# Patient Record
Sex: FEMALE | Race: WHITE | NOT HISPANIC OR LATINO | Employment: OTHER | URBAN - METROPOLITAN AREA
[De-identification: names, ages, dates, MRNs, and addresses within clinical notes are randomized per-mention and may not be internally consistent; named-entity substitution may affect disease eponyms.]

---

## 2018-01-16 ENCOUNTER — OFFICE VISIT (OUTPATIENT)
Dept: PSYCHIATRY | Facility: CLINIC | Age: 28
End: 2018-01-16
Attending: PSYCHOLOGIST
Payer: COMMERCIAL

## 2018-01-16 DIAGNOSIS — F40.10 SOCIAL ANXIETY DISORDER: ICD-10-CM

## 2018-01-16 DIAGNOSIS — F42.2 MIXED OBSESSIONAL THOUGHTS AND ACTS: Primary | ICD-10-CM

## 2018-01-16 NOTE — MR AVS SNAPSHOT
After Visit Summary   1/16/2018    Corie Molina    MRN: [id number]           Patient Information     Date Of Birth          1990        Visit Information        Provider Department      1/16/2018 9:00 AM [facility]        Today's Diagnoses     Mixed obsessional thoughts and acts    -  1    Social anxiety disorder        Depression, postpartum, postpartum condition           Follow-ups after your visit        Your next 10 appointments already scheduled     Feb 12, 2018 12:00 PM CST   Adult Psychotherapy with Honey Shearer   Psychiatry Clinic ([facility])    [address]  [address] [facility] [id number] [phone]            Feb 20, 2018 10:00 AM CST   Women's Well-Being with Alexus Goncalves MD   Psychiatry Clinic ([facility])    [address] [facility] [id number] [phone]              Who to contact     Please call your clinic at [phone] to:    Ask questions about your health    Make or cancel appointments    Discuss your medicines    Learn about your test results    Speak to your doctor   If you have compliments or concerns about an experience at your clinic, or if you wish to file a complaint, please contact [facility] Patient Relations at [phone] or email us at [email or web address]         Additional Information About Your Visit        MyChart Information     [employer] is an electronic gateway that provides easy, online access to your medical records. With CoverMe, you can request a clinic appointment, read your test results, renew a prescription or communicate with your care team.     To sign up for [employer] visit the website at [email or web address]   You will be asked to enter the access code listed below, as well as some personal information. Please follow the directions to create  your username and password.     Your access code is: [email or web address]  Expires: 2018 10:17 AM     Your access code will  in 90 days. If you need help or a new code, please contact your [facility] or call [phone] for assistance.        Care EveryWhere ID     This is your Care EveryWhere ID. This could be used by other organizations to access your [facility] medical records  [id number]         Blood Pressure from Last 3 Encounters:   18 113/79   18 119/73    Weight from Last 3 Encounters:   18 59.1 kg (130 lb 6.4 oz)   18 60 kg (132 lb 3.2 oz)              We Performed the Following     PSYCHOLOGICAL TEST BY PSYCHOLOGIST/MD, PER HR        Primary Care Provider Office Phone # Fax #    Stepan CLARICE Perez MD, [phone]       [address] [address]        Equal Access to Services     GIULIA DOTY : Hadii aad ku hadasho Soomaali, waaxda luqadaha, qaybta kaalmada adeegyada, waxay idiin hayaan adeeg kharash la'karenn . So [facility] [phone].    ATENCIÓN: Si habla español, tiene a marin disposición servicios gratuitos de asistencia lingüística. Llame al [phone].    We comply with applicable federal civil rights laws and [place] laws. We do not discriminate on the basis of race, color, national origin, age, disability, sex, sexual orientation, or gender identity.            Thank you!     Thank you for choosing PSYCHIATRY CLINIC  for your care. Our goal is always to provide you with excellent care. Hearing back from our patients is one way we can continue to improve our services. Please take a few minutes to complete the written survey that you may receive in the mail after your visit with us. Thank you!             Your Updated Medication List - Protect others around you: Learn how to safely use, store and throw away your medicines at www.disposemymeds.org.      Notice  As of 2018 11:59 PM     You have not been prescribed any medications.

## 2018-01-22 ENCOUNTER — OFFICE VISIT (OUTPATIENT)
Dept: PSYCHIATRY | Facility: CLINIC | Age: 28
End: 2018-01-22
Payer: COMMERCIAL

## 2018-01-22 DIAGNOSIS — F42.2 MIXED OBSESSIONAL THOUGHTS AND ACTS: Primary | ICD-10-CM

## 2018-01-22 DIAGNOSIS — F40.10 SOCIAL ANXIETY DISORDER: ICD-10-CM

## 2018-01-22 NOTE — PROGRESS NOTES
[facility]                                                                                                        Department of Psychiatry  [phone] (Office)                                                                                                      [address] [place]  [phone] (Clinic)                                                                                                       [address] [place] [address]  [phone] (Fax)                                                                                                          [place]  [address]          [facility]OM[facility] WELL[facility]ING CLINIC  DIAGNOSTIC EVALUATION AND PSYCHOLOGICAL ASSESSMENT        Patient: Corie Molina                MRN: [id number]  YOB: 1990                                           Evaluator: Dyan Reyes Psy.D., L.PHipolito  Date of Visit: 2018     Diagnostic interview time with patient: 1.75 hours  Psychological assessment scoring, interpretation, and report writin hours    IDENTIFYING DATA:  Corie is a 27-year-old female who presented for the current evaluation as part of the intake process for the Women s Wellbeing Program. She is currently 6 weeks post-partum with her second child and is breast feeding. She was referred by [facility] following participation in their Partial Hospitalization Program (5 hours per day/4 days per week). She participated in their PHP starting at 38 weeks pregnant, she left the program to have her baby, and returned 8 days post partum and completed the program on 18. She reported that she was diagnosed with OCD and she has difficulty with perfectionism.    The following information was obtained through an interview and questionnaires completed by Corie. Limits of confidentiality and the purpose of the appointment (diagnostic evaluation) were described at the beginning of the session.     CHIEF COMPLAINT:   OCD, perfectionism, and sadness.  "\"It has been consuming my life,  and  I m feeling bad about not being the person I want to be.      MENTAL HEALTH HISTORY AND DIAGNOSTIC INTERVIEW:  Corie completed the MINI International Neuropsychiatric Interview to aid in diagnostic assessment of current psychological functioning.     Corie reported a history of post-partum depression 2 months after the birth of her 2-year-old son. She reported current symptoms of depression that have worsened in the post partum period. In the past two weeks she has felt sad, hopeless, decreased appetite, slowed down, low energy, worthlessness and guilt, and hopelessness some to much of the time, typically precipitated by her symptoms of obsessive compulsive disorder (described below). She also reported feeling loss of interest or pleasure in activities she used to enjoy, all of the time. She denied difficulty falling or staying asleep and noted this is likely due to her medication. She reported she does not hear her baby cry and her  wakes her about 2 times per night to breastfeed her baby. She denies current and a history of thoughts of death and suicide and she denies past suicide attempts.     Corie reported recurrent anxious thoughts that are unwanted and distressing about multiple things (e.g., messes in her home, messes her son makes, orderliness, cleanliness, germs, food) that she tries to reduce by cleaning, organizing, picking up, planning, etc. to make things  just so.  In the past, following the birth of her son, she had a recurring intrusive image of a stabbing (not necessarily of her son or herself), though she denies current intrusive imagery. Currently, she feels driven to repeatedly clean, organize, order things, plan or prepare so that things are  just so  and follow her own rigid rule about how things should be. These rituals are done to prevent or reduce anxiety and distress and are excessive and unreasonable. She described herself as having "  unrelenting standards  for herself and everyone else. She reported that she likes to plan what she is going to eat based on the food groups, and she often writes out detailed menus and ingredients and will not diverge from them. For example, if she has rice for lunch she will not have it for dinner, she is unable to eat leftovers, and she cannot eat foods or sides that  don t go together . She also reported that if her  gives her son foods that  don t go together  (e.g., pizza and pasta), she can t watch him eat it. She struggles shopping in grocery stores due to her rules about food/meals and making the right decisions. She refuses to use public restrooms and avoids them at all costs due to concerns about cleanliness. She reported she will also regularly change her own and her children s clothing after being in public due to germs, and between most activities. She will sometimes vacuum her house twice before people come to her home. She reported significant anxiety about messes her son makes, and reported she can t enjoy playing with her son because things are out of place and she will compulsively pick things up and put things back in their place, as she feels driven to keep things in order. She also asks her  to sit a certain way on the couch so he does not mess up the placement of the couch cushions. These obsessions and compulsions take up much of her day and it can take long periods to get out of the house, and is exhausting for Corie. She reported spending greater than 3 and up to 8 hours per day engaging in compulsions, and that her mind is consumed with obsessive thoughts greater than 3 and up to 8 hours per day. She reported feelings of significant irritability, anger and frustration associated with her obsessions and compulsions and finds them very disturbing. These concerns are leading to stress in her marital relationship, as well as in her relationships with her children. She reported  her OCD concerns have come out a lot within her relationship with her son, who would interrupt her ability to complete her compulsions. Learning this has allowed her to understand why she had been getting so frustrated and upset with him, and she has worked on parenting him differently in light of what she has learned about her OCD. In addition, she reported that she left her family twice while she was pregnant because she did not know what to do, and when she started the PHP program it was very helpful to gain understanding about her symptoms.       Corie tried engaging in some exposure therapy while participating in the Mother Baby Program, which included not picking up her house one night, and having her son wear non-matching pajamas. She reported these both did not go well.     Corie reported persistent fear and significant anxiety about being watched or judged in social situations. She reported anxiety initiating or maintaining a conversation, participating in small groups, speaking to authority figures, attending parties, public speaking, and performing in front of others. These social situations almost always bring on fear or anxiety, and she avoids them as much as possible. She noted significant social anxiety also associated with her son and parenting her son. She feels a sense of doom in some social situations and often feels the need to plan ahead for social situations as much as possible, and will often ask her  for feedback on how she did in social situations they are in together (e.g., if they have people over to their home). She reported her anxiety is excessive and unreasonable and causes significant distress and interference with her functioning, and has been persistent for at least 6 months.    Corie also reported that she has an anxious habit of skin picking. She reported picking skin on her face (e.g., dry skin, blemishes), back, and the back of her arm. She reported a decrease in  frequency currently, though it has resulted bleeding in the past.    Corie reported a history of an intense fear of gaining weight and a history of engaging in eating disordered behaviors in high school and college (about 4 years total) including: restricting, over-exercising (2-3 times per day), experimenting with purging and laxative use. She reported that when she was exercising this much it was the  happiest time in her life  and she is  striving to be that person again . While she denied engaging in current eating disordered behaviors per the interview, she endorsed ongoing fear of gaining weight, a hyper focus on food and eating, and food related obsessions and compulsions in the form of wanting her eating, meal planning and meals to be  just so.  (see additional related information associated with symptoms of OCD).     Corie denied a history of experiencing abuse or traumatic experiences. She denied symptoms consistent with Bipolar Disorder, Panic Disorder, Substance Abuse Disorders, Psychotic Disorders, Post Traumatic Stress Disorder, or Attention Deficit Hyperactivity Disorder.      PREVIOUS PSYCHIATRIC HISTORY: Corie reported that she experienced symptoms of anxiety and an eating disorder in high school and college, though she did not receive treatment. About 3 years ago while planning her wedding she reported feeling very anxious and angry and she started a medication (did not remember the name) prescribed by her primary care doctor. She did not find the medication helpful, and discontinued it one month after the wedding. She experienced post-partum depression 2 months after the birth of her son, and she subsequently participated in therapy for about 7 months. She reported having a positive experience participating in the PHP program at the [facility] (as noted above), where it was first identified that she had symptoms consistent with OCD.      CURRENT PSYCHIATRIC MEDICATIONS: Corie is  taking sertraline 150 mg and risperidone 0.5mg (both taken at night). She started taking sertraline at 38 weeks pregnant, and she began taking risperidone about 3 weeks ago, both prescribed by Aimee Mariee MD at [facility].     PREGNANCY/MEDICAL HISTORY: Corie reported a notable difference between her two pregnancy and birth experiences. She said she gained 50 lbs. with her son, and 20 lbs. with her daughter. She reported a negative experience with medical professionals and during the birth process with her son. She gave birth to him at [facility] and due to distress he was taken to the NICU for several hours without Corie being able to hold him. She had to ask if she could see him and ask what his gender was. She reported this impacted her ability to connect with him, which was later addressed in therapy at the [facility] when she was seeking treatment around the birth of her daughter. She also reported a very difficult recovery from her son s birth and that she experienced exceptional pain and difficulty walking. She reported a much more positive experience during the pregnancy and birth process with her daughter. She worked with a midwife and gave birth at [facility] noting the birth was a  wonderful experience.     No additional medical concerns or history of chronic illness was reported.      SOCIAL HISTORY: Corie has been  to her  Nando for 3 years and they live with their 2-year-old son (Lamin) and 6-week old daughter (Valerie) in [place], [place]. She attended college and received a Bachelor s Degree. 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    She has one younger sister and an older brother and her parents are  and live in the [place] [facility]. Growing up she described her dad as  really strict  and said that she was the  perfect child.  Currently, she said  she sees her mother regularly and finds her helpful in times of crisis and not as helpful at other periods of time. She noted feeling like she wants to please her mom and this impacts her drive for perfectionism.       FAMILY PSYCHIATRIC HISTORY: Corie s mother has a history of anxiety and untreated alcoholism. She thinks her father takes medication for a mental health concern, though she does not know what he is being treated for. Her sister has a history of depression and she participated in a hospital day treatment program for depression. No additional family history of psychiatric disorders was reported.      PSYCHOLOGICAL TESTS ADMINISTERED:    M.I.N.I. International Neuropsychiatric Interview  Self-Reported Symptom Ratings  Sleep Disturbance  Yordan Exercise  Drug Abuse Screen Test (DAST 10)  Behavioral Inhibition Scale (BIS)  Behavioral Activation Scale (BAS)  Adverse Childhood Events (ACE)  Personal Wellbeing  Rhodes  Depression Scale (EPDS)  Generalized Anxiety Disorder 7 Scale (NILE-7)  Za Brown Obsessive-Compulsive Scale (YBOCS)  Eating Disorder Examination Questionnaire (ERIK-Q)       Measures                                                            Raw Scores                              Z Scores  Sleep Disturbance                                                      22.00                                         .22  Yordan Exercise                                                            6.00                                          NA  AUDITC                                                                    1.00                                          NA DAST10                                                                     0.00                                          NA                      Personal Wellbeing                                                      61.43                                      - 0.88                    ACE                                                                             1.00                                          NA                                            BAS Drive                                                                 3.00                                    0.32                                        BAS Reward                                                                2.20                                        -3.02   BAS Fun                                                                    1.00                                           -3.06  BIS                                                                             4.00                                           2.04  EPDS                                                                          17.00                                          NA  NILE-7                                                                        16.00                                          NA  YBOCS                                                                        29.00                                         NA  ERIK-Q Restraint                                                            3.60                                          1.69  ERIK-Q Eating Concern                                                  2.80                                           1.88  ERIK-Q Weight Concern                                                 5.40                                           2.32  ERIK-Q Shape Concern                                                   6.00                                           2.2  ERIK-Q Global                                                                 4.45                                           2.33      INTERPRETATION OF PSYCHOLOGICAL TESTS:   Corie reported that her most concerning symptoms are disrupted functioning, anxiety, ruminative thoughts, and obsessions. She reports no trouble falling asleep and some trouble  staying asleep. She reports engaging in limited physical activity at this time. She reports eating 3 meals a day more than half the time, eating healthy food more than half the time, not engaging in overeating, and always under-eating or restricting her eating. She does not report concerning substance use. She endorsed personal wellbeing in the significantly below average range and high perceived stressed. She endorsed experiencing two concerning adverse childhood events. She reported motivation for achieving goals within normal limits, and motivation in response to rewards and motivation in response to fun activities in the significantly below average range. She reported elevated sensitivity to punishments. Her primary coping skills include acceptance, planning, active coping and positive reframing, which are considered positive ways of coping, as well as self blame, which is considered a less useful way of coping. She endorsed severe generalized anxiety symptoms, clinical threshold depression symptoms, and severe obsessive compulsive symptoms. In terms of disordered eating, she endorsed above average restraint, above average eating concern, significantly above average shape concern, significantly above average weight concern, and significantly above average global eating disorder behavior. She denied binge eating, self-induced vomiting, laxative misuse, and driven exercise.     MENTAL STATUS EXAM:  Corie was casually dressed, nicely groomed and appeared her stated age. Eye contact was appropriate. Mood appeared anxious. Corie was pleasant, engaged, responsive and open. She was oriented to person, place, and time. She was cooperative and answered the questions asked by the examiner. Attention and concentration were in the normal range. Her speech was normal in tone, rate and volume. Some rigidity noted in her posture and physical presentation. She readily responded to her daughter s cues and breast fed her during  the evaluation and changed her daughter s diaper as well. Her thought process was linear, logical, and goal-oriented. Insight and judgment were intact. She demonstrated growing insight into her symptoms and seemed motivated for treatment.       DSM-5 DIAGNOSES:  Obsessive Compulsive Disorder (OCD)  Social Anxiety Disorder (Social Phobia)  Depressive Disorder NEC  R/O Eating Disorder NEC    PLAN AND RECOMMENDATIONS:   Taken together, the results of this evaluation suggest that Corie is likely to benefit from interventions to target her symptoms consistent with a primary diagnosis of OCD, associated mood disorder, and her symptoms of social anxiety disorder. Given Corie s interest in being followed in this clinic for medication management, Corie will complete an evaluation with Alexus Goncalves MD, on 1/23/2018 to discuss ongoing pharmacologic treatments. She is also scheduled on 1/22/2018 to begin therapy with a provider in this clinic specializing in the treatment of OCD to initiate Cognitive Behavioral Therapy (CBT).          Dyan Reyse Psy.D., L.P.    Department of Psychiatry

## 2018-01-22 NOTE — MR AVS SNAPSHOT
After Visit Summary   1/22/2018    Corie Molina    MRN: [id number]           Patient Information     Date Of Birth          1990        Visit Information        Provider Department      1/22/2018 11:30 AM [facility]        Today's Diagnoses     Mixed obsessional thoughts and acts    -  1    Social anxiety disorder        Depression, postpartum, postpartum condition           Follow-ups after your visit        Your next 10 appointments already scheduled     Feb 05, 2018 12:00 PM CST   Adult Psychotherapy with [facility]   Psychiatry Clinic ([facility])    [address]  [address] [facility] [address]   [phone]            Feb 06, 2018 10:30 AM CST   Women's Well-Being with Alexus Goncalves MD   Psychiatry Clinic ([facility])    [address] [place] [address]  [address] [facility] [address]   [phone]            Feb 20, 2018 10:00 AM CST   Women's Well-Being with Alexus Goncalves MD   Psychiatry Clinic ([address]  [address] [facility] [address] [phone]              Who to contact     Please call your clinic at [phone] to:    Ask questions about your health    Make or cancel appointments    Discuss your medicines    Learn about your test results    Speak to your doctor   If you have compliments or concerns about an experience at your clinic, or if you wish to file a complaint, please contact [facility] Patient Relations at [phone] or email us at [email or web address]         Additional Information About Your Visit        MyChart Information     [employer] is an electronic gateway that provides easy, online access to your medical records. With [employer], you can request a clinic appointment, read your test results, renew a prescription or communicate with your care  team.     To sign up for Tip or Skipt visit the website at [email or web address]   You will be asked to enter the access code listed below, as well as some personal information. Please follow the directions to create your username and password.     Your access code is: [email or web address]  Expires: 2018 10:17 AM     Your access code will  in 90 days. If you need help or a new code, please contact your [facility] or call [phone] for assistance.        Care EveryWhere ID     This is your Care EveryWhere ID. This could be used by other organizations to access your [place] medical records  [id number]         Blood Pressure from Last 3 Encounters:   18 119/73    Weight from Last 3 Encounters:   18 60 kg (132 lb 3.2 oz)              Today, you had the following     No orders found for display       Primary Care Provider Office Phone # Fax #    Stepan CLARICE Perez MD, [phone]       [address] [address]        Equal Access to Services     [facility]: Hadii aad ku hadasho Soomaali, waaxda luqadaha, qaybta kaalmada adeginayada, rupinder gunderson . So [facility] [phone].    ATENCIÓN: Si habla español, tiene a marin disposición servicios gratuitos de asistencia lingüística. Armida al [phone].    We comply with applicable federal civil rights laws and [place] laws. We do not discriminate on the basis of race, color, national origin, age, disability, sex, sexual orientation, or gender identity.            Thank you!     Thank you for choosing [facility] CLINIC  for your care. Our goal is always to provide you with excellent care. Hearing back from our patients is one way we can continue to improve our services. Please take a few minutes to complete the written survey that you may receive in the mail after your visit with us. Thank you!             Your Updated Medication List - Protect  others around you: Learn how to safely use, store and throw away your medicines at www.disposemymeds.org.      Notice  As of 1/22/2018 11:59 PM    You have not been prescribed any medications.

## 2018-01-23 ENCOUNTER — OFFICE VISIT (OUTPATIENT)
Dept: PSYCHIATRY | Facility: CLINIC | Age: 28
End: 2018-01-23
Attending: PSYCHIATRY & NEUROLOGY
Payer: COMMERCIAL

## 2018-01-23 VITALS — WEIGHT: 132.2 LBS | SYSTOLIC BLOOD PRESSURE: 119 MMHG | HEART RATE: 71 BPM | DIASTOLIC BLOOD PRESSURE: 73 MMHG

## 2018-01-23 DIAGNOSIS — F42.9 OBSESSIVE-COMPULSIVE DISORDER, UNSPECIFIED TYPE: Primary | ICD-10-CM

## 2018-01-23 PROBLEM — O99.340 DEPRESSION COMPLICATING PREGNANCY, ANTEPARTUM: Status: ACTIVE | Noted: 2017-11-16

## 2018-01-23 PROBLEM — F32.A DEPRESSION COMPLICATING PREGNANCY, ANTEPARTUM: Status: ACTIVE | Noted: 2017-11-16

## 2018-01-23 PROCEDURE — G0463 HOSPITAL OUTPT CLINIC VISIT: HCPCS | Mod: ZF

## 2018-01-23 RX ORDER — ACETAMINOPHEN AND CODEINE PHOSPHATE 120; 12 MG/5ML; MG/5ML
1 SOLUTION ORAL DAILY
COMMUNITY
End: 2021-10-25

## 2018-01-23 RX ORDER — RISPERIDONE 0.5 MG/1
0.5 TABLET ORAL 2 TIMES DAILY
Qty: 60 TABLET | Refills: 1 | Status: SHIPPED | OUTPATIENT
Start: 2018-01-23 | End: 2018-02-06

## 2018-01-23 RX ORDER — SERTRALINE HYDROCHLORIDE 100 MG/1
150 TABLET, FILM COATED ORAL DAILY
Qty: 30 TABLET | Refills: 1 | Status: SHIPPED | OUTPATIENT
Start: 2018-01-23 | End: 2018-02-06

## 2018-01-23 ASSESSMENT — PAIN SCALES - GENERAL: PAINLEVEL: NO PAIN (0)

## 2018-01-23 NOTE — MR AVS SNAPSHOT
After Visit Summary   1/23/2018    Corie Molina    MRN: [id number]           Patient Information     Date Of Birth          1990        Visit Information        Provider Department      1/23/2018 9:00 AM [facility]calves [facility]        Today's Diagnoses     Obsessive-compulsive disorder, unspecified type    -  1      Care Instructions    1) MEDICATIONS:  - Continue Zoloft 150 mg daily   - Continue Risperdal 0.5 mg twice daily      2) THERAPY: Continue couples and individual     3) RTC: in 4-6 weeks with Dr. Goncalves for 30 min [facility] in [facility]. Please call Danette Ramírez RN @ [phone] with any questions or concerns. Danette will call you in ~1 week to check-in and consider an increase in Zoloft at that time.    4) OTHER:  -Patient was encouraged to bring her partner/support person to future appts  -[employer]ed is a good source for information on medications in breastfeeding  -www.womensmentalhealth.org is a good resource for information about psychiatric medication in pregnancy/lactation    5) CRISIS NUMBERS:   Provided routinely in [facility].  Pregnancy & Post[place] Support [place] (PPS) Helpline: [phone] (Call or Text)            Follow-ups after your visit        Follow-up notes from your care team     Return in about 4 weeks (around 2/20/2018).      Your next 10 appointments already scheduled     Jan 29, 2018 12:00 PM CST   Adult Psychotherapy with [facility]   Psychiatry Clinic ([facility])    [address] [place] [address] [facility] [id number] [phone]            Feb 20, 2018 10:00 AM CST   Women's Well-Being with Alexus Goncalves MD   Psychiatry Clinic ([facility])    [address] [place] [address] [facility] [address] [phone]              Who to contact     Please call your clinic at [phone] to:    Ask questions about your health    Make or cancel appointments    Discuss  your medicines    Learn about your test results    Speak to your doctor   If you have compliments or concerns about an experience at your clinic, or if you wish to file a complaint, please contact [facility] Patient Relations at [phone] or email us at [email or web address]         Additional Information About Your Visit        [employer] Information     [employer] is an electronic gateway that provides easy, online access to your medical records. With [employer], you can request a clinic appointment, read your test results, renew a prescription or communicate with your care team.     To sign up for [employer] visit the website at [email or web address]   You will be asked to enter the access code listed below, as well as some personal information. Please follow the directions to create your username and password.     Your access code is: [email or web address]  Expires: 2018 10:17 AM     Your access code will  in 90 days. If you need help or a new code, please contact your [facility] or call [phone] for assistance.        Care EveryWhere ID     This is your Care EveryWhere ID. This could be used by other organizations to access your [place] medical records  [id number]        Your Vitals Were     Pulse                   71            Blood Pressure from Last 3 Encounters:   18 119/73    Weight from Last 3 Encounters:   18 60 kg (132 lb 3.2 oz)              Today, you had the following     No orders found for display       Primary Care Provider Office Phone # Fax #    Stepan CLARICE Perez MD, [phone]       [address] [address]        Equal Access to Services     ZENAIDA DOTY : Hadii leda Kaye, darlene escamilla, rupinder patricia. So [facility] [phone].    ATENCIÓN: Si habla español, tiene a marin disposición  servicios gratuitos de asistencia lingüística. Armida to [phone].    We comply with applicable federal civil rights laws and [place] laws. We do not discriminate on the basis of race, color, national origin, age, disability, sex, sexual orientation, or gender identity.            Thank you!     Thank you for choosing [facility]IN[facility]  for your care. Our goal is always to provide you with excellent care. Hearing back from our patients is one way we can continue to improve our services. Please take a few minutes to complete the written survey that you may receive in the mail after your visit with us. Thank you!             Your Updated Medication List - Protect others around you: Learn how to safely use, store and throw away your medicines at www.disposemymeds.org.          This list is accurate as of: 1/23/18 10:17 AM.  Always use your most recent med list.                   Brand Name Dispense Instructions for use Diagnosis    ADDERALL PO      Take 20 mg by mouth daily        RISPERIDONE PO      Take 0.5 mg by mouth 2 times daily        ZOLOFT PO      Take 150 mg by mouth daily

## 2018-01-23 NOTE — NURSING NOTE
Chief Complaint   Patient presents with     Eval/Assessment     OCD, anxiety        Reviewed Allergies, Medications, Pharmacy, Smoking Status, and Pain Level  Administered Abuse Screening Questions   Obtained Weight, Blood Pressure, Heart Rate

## 2018-01-23 NOTE — PATIENT INSTRUCTIONS
1) MEDICATIONS:  - Continue Zoloft 150 mg daily   - Continue Risperdal 0.5 mg twice daily      2) THERAPY: Continue couples and individual     3) RTC: in 4-6 weeks with Dr. Goncalves for 30 min MFU in [facility]. Please call Danette Ramírez RN @ [phone] with any questions or concerns. Danette will call you in ~1 week to check-in and consider an increase in Zoloft at that time.    4) OTHER:  -Patient was encouraged to bring her partner/support person to future appts  -[employer] is a good source for information on medications in breastfeeding  -www.womensmentalhealth.org is a good resource for information about psychiatric medication in pregnancy/lactation    5) CRISIS NUMBERS:   Provided routinely in [place].  Pregnancy & Post[place] Support [place] (PPSM) Helpline: [phone] (Call or Text)

## 2018-01-23 NOTE — PROGRESS NOTES
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    The patient was referred by Dr. Aimee Mariee of [facility] for evaluation of depression and OCD.    Partner/ Support: Nando ()  Children: Lamin (born 2016) and Valerie (born 2017)  Therapist: Honey Shearer/Dr. Braga for CBT (sees weekly) and Sridhar Duarte of [facility] for couples therapy Q every other week (Auth to discuss on file)   PCP: Stepan Perez MD  OB-GYN: DILEEP Gregory CNM of [facility]    She is accompanied by her two children Lamin and Valerie today. She had planned to leave them home, but the family member who was going to [facility] became ill.   Chief Concern                                                                                                      \" I want to make sure I'm on the right medications. \"       History of Present Illness                                                                                      4,4     Pertinent Background:  OCD predating pregnancies (although only recently dx), PPD after the birth of her first child, disordered eating and intense fear of gaining weight.  Please see DA completed by Dyan Reyes PsyD, LP dated 18 for additional details of the HPI.  Psych critical item history includes suicidal ideation x1, fleeting and without intent.     Most recent history began:  Per 18 DA by Dr. Dyan Reyes (confirmed with pt):  Corie reported a history of post-partum depression 2 months after the birth of her 2-year-old son. She reported current symptoms of depression that have worsened in the post partum period. ...She " denied difficulty falling or staying asleep and noted this is likely due to her medication. She reported she does not hear her baby cry and her  wakes her about 2 times per night to breastfeed her baby.      Corie reported recurrent anxious thoughts that are unwanted and distressing about multiple things (e.g., messes in her home, messes her son makes, orderliness, cleanliness, germs, food) that she tries to reduce by cleaning, organizing, picking up, planning, etc. to make things  just so.  In the past, following the birth of her son, she had a recurring intrusive image of a stabbing (not necessarily of her son or herself), though she denies current intrusive imagery. Currently, she feels driven to repeatedly clean, organize, order things, plan or prepare so that things are  just so  and follow her own rigid rule about how things should be. These rituals are done to prevent or reduce anxiety and distress and are excessive and unreasonable. She described herself as having  unrelenting standards  for herself and everyone else. She reported that she likes to plan what she is going to eat based on the food groups, and she often writes out detailed menus and ingredients and will not diverge from them. For example, if she has rice for lunch she will not have it for dinner, she is unable to eat leftovers, and she cannot eat foods or sides that  don t go together . She also reported that if her  gives her son foods that  don t go together  (e.g., pizza and pasta), she can t watch him eat it. She struggles shopping in grocery stores due to her rules about food/meals and making the right decisions. She refuses to use public restrooms and avoids them at all costs due to concerns about cleanliness. She reported she will also regularly change her own and her children s clothing after being in public due to germs, and between most activities. She will sometimes vacuum her house twice before people come to her  "home. She reported significant anxiety about messes her son makes, and reported she can t enjoy playing with her son because things are out of place and she will compulsively pick things up and put things back in their place, as she feels driven to keep things in order. She also asks her  to sit a certain way on the couch so he does not mess up the placement of the couch cushions. These obsessions and compulsions take up much of her day and it can take long periods to get out of the house, and is exhausting for Corie. She reported spending greater than 3 and up to 8 hours per day engaging in compulsions, and that her mind is consumed with obsessive thoughts greater than 3 and up to 8 hours per day. She reported feelings of significant irritability, anger and frustration associated with her obsessions and compulsions and finds them very disturbing. These concerns are leading to stress in her marital relationship, as well as in her relationships with her children. She reported her OCD concerns have come out a lot within her relationship with her son, who would interrupt her ability to complete her compulsions. Learning this has allowed her to understand why she had been getting so frustrated and upset with him, and she has worked on parenting him differently in light of what she has learned about her OCD. In addition, she reported that she left her family twice while she was pregnant because she did not know what to do, and when she started the PHP program it was very helpful to gain understanding about her symptoms.       Today:  - Symptoms consistent with what Dr. Dobbins reported as above from last week  - Does describe one episode of fleeting, SI while in the parking ramp with her  awaiting their couples therapy appt. Zoraida notes that this was the one and only time she has had suicidal ideation and that \"it scared me.\"  She reports just having a disagreement with her , and having thoughts " "she should divorce him when she pictured running to the ledge of the parking ramp and jumping off. She immediately told her  about these thoughts and they agreed they should go to their scheduled appt with [facility] couples therapist, Sridhar, to discuss further. After discussing with her therapist, Zoraida states she quickly realized \" my  was not the answer,\" and \"felt more hopeful.\" Thoughts/images of jumping from the ramp stopped quickly upon discussing with Sridhar. She further describes this SI as somewhat intrusive, although not always. Zoraida remembers thinking of her children in that moment and that \"I couldn't do that to them.\" She denies any SI since then and affirms feeling more hopeful today. She denies stockpiles of pills and has already requested her  hold and distribute her medications at the suggestion of her therapist. They do have a gun in the home, but it is locked and not loaded. Zoraida has no idea where the key or ammunition could be. She feels safe to leave here today and states she would again immediately tell Nando if SI were to return.  - Noted great benefit from [facility] and that it was \"hard to leave.\" Continuing with therapist, Sridhar, there for couples counseling.   - Sleeps from 11-5 AM, waking 1-2x/night to NESSA Padilla. Notes her  will awake her as she doesn't always here her cries.  - Per chart review, PP visit on 1/16 with EPDS score of 17 and the mini-pill was started for conception  - Recently started CBT here. Notes an uptick in sx since tracking per homework.  - Medications: Started Zoloft by Dr. Mariee ~Nov and was increased to 150mg ~3-4 weeks ago. Feels this medication has been beneficial thus far and denies SE. Risperidone was started around the end of Dec by  to further target OCD sx and she was instructed by the covering provider, Dr. Anderson, last Friday to increase to BID (as prescribed) after SI as noted above. Zoraida now takes risperidone " "at 5 AM and 3 PM, noting the early evenings as a difficult time in her house with dinner and bedtime routine. Notes her obsessions are less intense and are \"able to come and go\" since the addition of risperidone. Reports some lightheadedness and fatigue since increasing to BID, although both are improving and are not impacting her functioning, particularly her ability to awake at night and BF. Would like to maintain medications at current doses today, but is open to potential increase in Zoloft in the future as discussed today.    Recent Symptoms:   Depression:  depressed mood, anhedonia, low energy, feeling worthless, excessive guilt, psychomotor changes [slowed] and feeling hopeless  Elevated:  none  Psychosis:  none  Anxiety:  social anxiety, obsessions [as above] and compulsions [as above]  Panic Attack:  none  Trauma Related:  none  Eating Disorder: no current restricting/binging/purging although food-related obsessions and fear of weight gain as above      Recent Substance Use  Alcohol- 2-3 beers/week     Substance Use History                                                               None        Psychiatric History   Per 1/16/18 DA by Dr. Dyan Reyes (confirmed with pt):  Corie reported that she experienced symptoms of anxiety and an eating disorder in high school and college, though she did not receive treatment. About 3 years ago while planning her wedding she reported feeling very anxious and angry and she started a medication (did not remember the name) prescribed by her primary care doctor. She did not find the medication helpful, and discontinued it one month after the wedding. She experienced post-partum depression 2 months after the birth of her son, and she subsequently participated in therapy for about 7 months. She reported having a positive experience participating in the [facility] (as noted above), where it was first identified that she had symptoms " consistent with OCD.     No hx of psychiatric hospitalizations or suicide attempts.     MOOD AND ANXIETY DISORDERS (PMADs):  Hx of depression or anxiety during pregnancy: yes  Hx of postpartum mood or anxiety disorder: yes  Hx of postpartum psychosis: no  Hx premenstrual mood/anxiety problems: not discussed today  Hx mood symptoms while taking oral contraceptives: not discussed today  Hx of infertility: no  Hx of traumatic birth: yes after the birth of her first child    Psychiatric Medication Trials       Drug /  Start Date Dose (mg) Helpful Adverse Effects   DC Reason / Date   Zoloft/~2017 @ 38 wks pregnant 150 yes none current   Risperidone/~end of Dec 2017 0.5 BID yes Lightheadedness/fatigue current     Social/ Family History               [per patient report]                                                        1ea,1ea   Per 18 DA by Dr. Dyan Reyes (confirmed with pt):  Corie has been  to her  Nando for 3 years and they live with their 2-year-old son (Lamin) and 6-week old daughter (Valerie) in [place], [place]. She attended college and received a Bachelor s Degree. 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     She has one younger sister and an older brother and her parents are  and live in the [facility]. Growing up she described her dad as  really strict  and said that she was the  perfect child.  Currently, she said she sees her mother regularly and finds her helpful in times of crisis and not as helpful at other periods of time. She noted feeling like she wants to please her mom and this impacts her drive for perfectionism.     Corie s mother has a history of anxiety and untreated alcoholism. She thinks her father takes medication for a mental health concern, though she does not know what he is being treated for. Her sister has a history of depression and she  participated in a hospital day treatment program for depression. No additional family history of psychiatric disorders was reported.   Medical / Surgical History                                                                                                                     Patient Active Problem List   Diagnosis     Cervical high risk HPV (human papillomavirus) test positive     Depression complicating pregnancy, antepartum     OCD (obsessive compulsive disorder)     Raynaud's disease       No past surgical history on file.     PREGNANCY/ OBGYN HISTORY:  Date Event # weeks Medical Complications Psychiatric Complications Psychotropic Meds or other Fetal Exposures   1/12/16 Birth of Lamin 39 Lamin in NICU PPD none   1/2017 SAB 6   none   12/3/17 Birth of Valerie 38.6 none PPD and OCD exacerbation none   Per 1/16/18 DA by Dr. Dyan Reyes (confirmed with pt):  Corie reported a notable difference between her two pregnancy and birth experiences. She said she gained 50 lbs. with her son, and 20 lbs. with her daughter. She reported a negative experience with medical professionals and during the birth process with her son. She gave birth to him at [facility] and due to distress he was taken to the NICU for several hours without Corie being able to hold him. She had to ask if she could see him and ask what his gender was. She reported this impacted her ability to connect with him, which was later addressed in therapy at the [facility] when she was seeking treatment around the birth of her daughter. She also reported a very difficult recovery from her son s birth and that she experienced exceptional pain and difficulty walking. She reported a much more positive experience during the pregnancy and birth process with her daughter. She worked with a midwife and gave birth at [facility] noting the birth was a  wonderful experience.     Medical Review of Systems                                                                                                            2,10   A comprehensive review of systems was performed and is negative other than noted in the HPI.  Pregnant- No    Breastfeeding- Yes    Contraception- Yes, mini-pill  Allergy                                Erythromycin and Penicillins  Current Medications                                                                                                         Current Outpatient Prescriptions   Medication Sig Dispense Refill     sertraline (ZOLOFT) 100 MG tablet Take 1.5 tablets (150 mg) by mouth daily 30 tablet 1     risperiDONE (RISPERDAL) 0.5 MG tablet Take 1 tablet (0.5 mg) by mouth 2 times daily 60 tablet 1     norethindrone (MICRONOR) 0.35 MG per tablet Take 1 tablet by mouth daily       Vitals                                                                                             /73  Pulse 71  Wt 60 kg (132 lb 3.2 oz)   Mental Status Exam                                                                                          9, 14 cog gs   Alertness: alert  and oriented  Appearance: well groomed  Behavior/Demeanor: cooperative, pleasant and calm, with good  eye contact   Speech: normal and regular rate and rhythm, quiet  Language: intact  Psychomotor: normal or unremarkable  Mood: depressed and worried  Affect: restricted, although does brighten and smile appropriately at times, was congruent to mood; was congruent to content  Thought Process/Associations: unremarkable  Thought Content:  Reports fleeting SI last Friday as discussed above, no current SI;  Denies suicidal and violent ideation and delusions  Perception:  Reports none;  Denies none  Insight: good  Judgment: excellent  Cognition: (6) does  appear grossly intact; formal cognitive testing was not done  Gait and Station: unremarkable    Labs and Data                                                                                                                   Rating  Scales:  As completed in [employer]    PHQ9 Today:  Not completed today  No flowsheet data found.    No lab results found.  No lab results found.    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      Today, Zoraida describes ongoing, yet somewhat improved, symptoms of OCD and depression with an isolated, fleeting episode of SI last week (see risk statement below). She notes improvement from completion of [facility], her current medication regimen, couples therapy, and has recently initiated CBT. Given recent increase in risperidone, Zoraida elects to delay potential increase in Zoloft  (given past benefit and likely need of higher dose to target OCD) to further target symptoms which is reasonable. Our team nurse, Danette, will follow-up with Corie in 1 weeks time and offer this increase again per the plan we partnered on today. The evidence-based CBT she has recently started will further attenuate symptoms. She is sleeping well, will continue to monitor sedation and fatigue from risperidone and would anticipate maximizing Zoloft and engagement in CBT will allow for future reduction/taper of this adjuvant treatment. It was notable today that she was able to leave the spill/mess of snacks by Lamin untouched in our visit. Obsessions around eating and eating behaviors will be continued to be closely monitored. She was encouraged to bring in support person/partner at upcoming visits.     Her strengths include: warm demeanor,  motivation, dedication, willingness to utilize supports, intelligence.     The r/b/a of* Zoloft and risperidone in lactation/pregnancy were discussed/reviewed with Corie A Quanjohana today. Dr. Mariee has also previously discussed these r/b/a. We also reviewed the risks of untreated  mood and anxiety disorders to both mother and infant. We reviewed SE of Zoloft and risperidone as well as general signs/symptoms in her breastfeeding child to monitor for (letheargy, decreased suck, change in behavior). She understands she is to continue arrange assistance with childcare while taking medications that may cause sedation. Corie Molina has had her questions answered, expresses her understanding of the information provided, and appears to have the capacity to give informed consent regarding treatment options.    SUICIDE RISK ASSESSMENT [details described above]:  Today Corie Molina denies SI/SIB, although she experienced one, fleeting thought of SI with plan to jump from parking ramp, without intent as discussed above last Friday.  She has notable risk factors for self-harm including severe anxiety and relationship conflict.  However, risk is mitigated by no h/o suicide attempt, no h/o risky impulsive behavior, no access to lethal means, describes a safety plan, h/o seeking help when needed, symptom improvement, future oriented, feeling hopeful, commitment to family, good social support   and stable housing.  Based on all available evidence she does not appear to be at imminent risk for self-harm therefore does not meet criteria for a 72-hr hold/  involuntary hospitalization.  Additional steps to minimize risk include: frequent clinic visits, anxiety/ insomnia mngmt, limit med supply and expand care team, and nurse follow-up check-in.  Safety Plan placed in AVS: No: but reviewed verbally today.    [place] Prescription Monitoring Program [] was not checked today:  not using controlled substances.   Drug  Interactions:  RISPERIDONE and SERTRALINE may result in increased risperidone exposure and risk of QT interval prolongation. Patient is aware of risks. Will consider role of EKG PRN.      Plan                                                                                                                             +,++     1) MEDICATIONS:  - Continue Zoloft 150 mg daily   - Continue Risperdal 0.5 mg twice daily      2) THERAPY: Continue couples and individual     3) RTC: in 4-6 weeks with Dr. Goncalves for 30 min [facility] in [facility]. Please call Danette Ramírez RN @ [phone] with any questions or concerns. Danette will call you in ~1 week to check-in and consider an increase in Zoloft (to 175mg vs 200mg) at that time.    4) OTHER:  -Patient was encouraged to bring her partner/support person to future appts  -Viyet is a good source for information on medications in breastfeeding  -www.womensmentalhealth.org is a good resource for information about psychiatric medication in pregnancy/lactation    5) CRISIS NUMBERS:   Provided routinely in [facility].  Pregnancy & Postpartum Support [place] (PPSM) Helpline: [phone] (Call or Text)       Treatment Risk Statement:  The patient understands the risks, benefits, adverse effects and alternatives.  No medical contraindications and risks of using street drugs or alcohol is understood. Agrees to call clinic for any problems. The patient understands to call 911 or go to the nearest ED if life threatening or urgent symptoms present. Psychotropic drug interaction check was done, including changes made today.     WHODAS 2.0  TODAY total score = N/A; [a 12-item WHODAS 2.0 assessment was not completed by the pt today and/or recorded in EPIC].     PROVIDER:  Alexus Goncalves MD    I did not see this patient directly. I have reviewed the documentation and I agree with the resident's plan of care.     Ceci Martel MD

## 2018-01-24 NOTE — PROGRESS NOTES
MENTAL HEALTH PROGRESS NOTE   PROCEDURE: 90-minute psychotherapy session   SESSION NUMBER: 1  DSM-5 DIAGNOSIS: F42.2 Mixed obsessional thoughts and acts; F40.10 Social anxiety disorder, F53 Depression, postpartium    Individual therapy from 11:30am-1:00pm. The client arrived on time for the 90 minute session. [place] was set to discuss relevant symptoms of OCD (i.e., ruminative worry about perfectionism and control and ritual checking and planning behavior) as indexed by the assessment in the client's chart (dated 1/16/18). Client reported aggravated symptoms of OCD following the birth of her first child. Primary concerns include excessive time spent cleaning, organizing her home, preparing meals, taking inventory of closets and cupboards, unrelenting perfectionistic standards for herself and her family, and the impairment these symptoms cause in her ability to function as a parent. Client was assigned daily behavior tracking forms to index her rituals and time spent ruminating. Plan to follow up next week.

## 2018-01-29 ENCOUNTER — OFFICE VISIT (OUTPATIENT)
Dept: PSYCHIATRY | Facility: CLINIC | Age: 28
End: 2018-01-29
Attending: PSYCHOLOGIST
Payer: COMMERCIAL

## 2018-01-29 DIAGNOSIS — F40.10 SOCIAL ANXIETY DISORDER: ICD-10-CM

## 2018-01-29 DIAGNOSIS — F42.2 MIXED OBSESSIONAL THOUGHTS AND ACTS: Primary | ICD-10-CM

## 2018-01-29 NOTE — PROGRESS NOTES
MENTAL HEALTH PROGRESS NOTE   PROCEDURE: 60-minute psychotherapy session   SESSION NUMBER: 2  DSM-5 DIAGNOSIS: F42.2 Mixed obsessional thoughts and acts; F40.10 Social anxiety disorder, F53 Depression, postpartium    Individual therapy from 12:00pm-1:00pm. The client arrived on time for the 60 minute session. [place] was set to discuss any changes in symptoms in the last week, review homework assignments (which was to complete daily behavior tracking forms to index her rituals and time spent ruminating), and to establish an exposure hierarchy. Client expressed the last week was somewhat difficult, likely as a result of tracking and being more cognizant of her distressing thoughts and behaviors. However, she expressed continued motivation for treatment despite some desire for higher levels of support. Homework assigned is to not take inventory of her own closet for the whole week, to not take inventory of her children's closets for at least three days, and to plan at least one social outing during the week that would keep her out of the house during bedtime. Plan to follow up next week.

## 2018-02-01 ENCOUNTER — TELEPHONE (OUTPATIENT)
Dept: PSYCHIATRY | Facility: CLINIC | Age: 28
End: 2018-02-01

## 2018-02-01 NOTE — PROGRESS NOTES
I (Nando Braga, Ph.D., ) reviewed this note and agree with its contents. I did not staff this session in clinic. This session will be reviewed in supervision prior to the patient's next scheduled clinic appointment.

## 2018-02-01 NOTE — TELEPHONE ENCOUNTER
OK to increase to Zoloft 200mg daily. She did not have concerning sx of a historical or current dx of bipolar when Dyan or I saw her, however can you be sure she is not endorsing other sx consistent with an emerging (hypo)shakira outside of agitation and what sounds to be a correlated activation? If you do identify additional sx of (hypo)shakira, please let me know as I would then be hesitant to increase the Zoloft and further run the risk of flipping her.     In regards to the Risperdal, she told me it had initially been helpful for her thoughts and was thus continued, although maybe this was an improvement at the daily dosing and her sx have worsened since [facility] increased to BID. Nonetheless, I would recommend stopping/decreasing. There have been case reports of risperidone-induced worsening of OCD (https://www.ncbi.nlm.nih.gov/pubmed/44317270). She can chose from stopping without taper (as she is at a low dose) or decreasing to 0.5 mg daily for 4-5 days then stopping. If she notes that her symptoms are much improved on the once daily dosing, she may maintain there as this was her previous dose.     Can you also provide her with safety #s again and review her safety plan? I see she has already scheduled an appt with me next week which is good.

## 2018-02-01 NOTE — TELEPHONE ENCOUNTER
Called Corie to discuss recommendations.  She said that she did find Risperdal helpful when she was only taking it once a day (in terms of intrusive thoughts).  She elected to continue taking Risperdal 0.5 mg, but to do split-dosing in order to see if she will be less sedated on it.      She will not go up to 200 mg on Zoloft right now, and will see how it goes with the decreased dose of Risperdal, and decide with Dr. Goncalves at her Tuesday appointment if further changes are warranted.  She denied a personal or family history of bipolar disorder, and has not experienced racing thoughts, increased energy, grandiosity, pressured speech, or elevated mood.      Provided her with the general clinic number, writer's direct line (with instruction to leave VM and call direct line if she needs immediate assistance), and after-hours on call #.  We agreed that she will check in on Monday.    Will route to Dr. Goncalves for FYI.

## 2018-02-01 NOTE — TELEPHONE ENCOUNTER
Spoke with patient who reported that in terms of depression, she is feeling okay and does find Zoloft helpful.  She would like to increase to 200 mg daily, which has been approved by Dr. Goncalves.  She has been taking it at night and would like to know if she should take it in the morning.  Explained that it is a matter of preference - some people find it activating when taken at night so it disrupts sleep.    She reported that she does not think Risperdal 0.5 mg BID is working well for her.  Since she started taking it, she has noticed an increase in intrusive thoughts, and a change in the characteristic towards sinister thoughts.  She reported that she is on the verge of having a lot of energy and that she is about to be agitated imminently.      The intrusive thoughts are ego-dystonic, such as driving off a bridge, poking her daughter's eyes out, and others of a similar nature that just randomly pops up in her mind now with much more frequency than before.  She denies that she will act on it, and she doesn't feel compelled to act, but these are distressing for her.  She practices thought stopping and tells herself that she is not like that and that is not who she is.  She is worried that if these are not med-related, then it means a progression in symptoms.    She feels like she is sleeping too hard on the Risperdal.  She gets up to feed her daughter about twice each night, but she has absolutely no recollection of having done so.    Scheduled for a follow up with Dr. Goncalves on Tuesday, the 6th, at 10:30 am.    Will route to Dr. Goncalves for recommendations.

## 2018-02-01 NOTE — TELEPHONE ENCOUNTER
Follow up requested by Dr. Goncalves re:    - anxiety, obsessive thoughts, and depression  - possible Zoloft increase to 200 mg if desired    Called and left VM requesting a return call.

## 2018-02-05 ENCOUNTER — OFFICE VISIT (OUTPATIENT)
Dept: PSYCHIATRY | Facility: CLINIC | Age: 28
End: 2018-02-05
Attending: PSYCHOLOGIST
Payer: COMMERCIAL

## 2018-02-05 DIAGNOSIS — F40.10 SOCIAL ANXIETY DISORDER: ICD-10-CM

## 2018-02-05 DIAGNOSIS — F42.2 MIXED OBSESSIONAL THOUGHTS AND ACTS: Primary | ICD-10-CM

## 2018-02-05 NOTE — PROGRESS NOTES
"MENTAL HEALTH PROGRESS NOTE   PROCEDURE: 60-minute psychotherapy session   SESSION NUMBER: 3  DSM-5 DIAGNOSIS: F42.2 Mixed obsessional thoughts and acts; F40.10 Social anxiety disorder, F53 Depression, postpartium    Individual therapy from 12:00pm-1:00pm. The client arrived on time for the 60 minute session. Clayton was set to review homework assignments and discuss new exposure assignments. Client noted quick and effective habituation to homework assignments over the past week, and expressed reduced anxiety around taking inventory of closets and missing her children's bedtime for planned social outings. However, she also noted increased awareness of anxiety focused on planning daily and weekly routines, and mismatched food pairings. We altered the exposure hierarchy to reflect changes in the client's anxiety, and homework was assigned to reduce inventory of kitchen cabinets, cook food without a recipe, eat foods that \"don't go together,\" and start coming up with activities to fill unplanned times in the day. Plan to follow up next week and review treatment plan.  "

## 2018-02-05 NOTE — MR AVS SNAPSHOT
After Visit Summary   2018    Corie Molina    MRN: [id number]           Patient Information     Date Of Birth          1990        Visit Information        Provider Department      2018 12:00 PM [facility]        Today's Diagnoses     Mixed obsessional thoughts and acts    -  1    Social anxiety disorder        Depression, postpartum, postpartum condition           Follow-ups after your visit        Your next 10 appointments already scheduled     2018 12:00 PM CST   Adult Psychotherapy with [facility]   Psychiatry Clinic ([facility])    [address]  [address] [facility] [address] [phone]            2018 10:00 AM CST   Women's Well-Being with Alexus Goncalves MD   Psychiatry Clinic ([facility])    [address] [facility] [id number] [phone]              Who to contact     Please call your clinic at [phone] to:    Ask questions about your health    Make or cancel appointments    Discuss your medicines    Learn about your test results    Speak to your doctor            Additional Information About Your Visit        MyChart Information     SmartShoot is an electronic gateway that provides easy, online access to your medical records. With SmartShoot, you can request a clinic appointment, read your test results, renew a prescription or communicate with your care team.     To sign up for SmartShoot visit the website at [email or web address]   You will be asked to enter the access code listed below, as well as some personal information. Please follow the directions to create your username and password.     Your access code is: [email or web address]  Expires: 2018 10:17 AM     Your access code will  in 90 days. If you need help or a new code, please contact your [facility] or call [phone] for  assistance.        Care EveryWhere ID     This is your Care EveryWhere ID. This could be used by other organizations to access your [facility] medical records  [id number]         Blood Pressure from Last 3 Encounters:   02/06/18 113/79   01/23/18 119/73    Weight from Last 3 Encounters:   02/06/18 59.1 kg (130 lb 6.4 oz)   01/23/18 60 kg (132 lb 3.2 oz)              Today, you had the following     No orders found for display       Primary Care Provider Office Phone # Fax #    Stepan CLARICE Perez MD, [phone]       [facility] [address] [facility] [address]        Equal Access to Services     GIULIA DOTY : Hadii leda guo hadasho Sopedrito, waaxda luqadaha, qaybta kaalmada adeegyada, rupinder gunderson . So [facility] [phone].    ATENCIÓN: Si habla español, tiene a marin disposición servicios gratuitos de asistencia lingüística. Llame al [phone].    We comply with applicable federal civil rights laws and [place] laws. We do not discriminate on the basis of race, color, national origin, age, disability, sex, sexual orientation, or gender identity.            Thank you!     Thank you for choosing [facility]IN[facility]  for your care. Our goal is always to provide you with excellent care. Hearing back from our patients is one way we can continue to improve our services. Please take a few minutes to complete the written survey that you may receive in the mail after your visit with us. Thank you!             Your Updated Medication List - Protect others around you: Learn how to safely use, store and throw away your medicines at www.disposemymeds.org.          This list is accurate as of 2/5/18 11:59 PM.  Always use your most recent med list.                   Brand Name Dispense Instructions for use Diagnosis    norethindrone 0.35 MG per tablet    MICRONOR     Take 1 tablet by mouth daily

## 2018-02-06 ENCOUNTER — OFFICE VISIT (OUTPATIENT)
Dept: PSYCHIATRY | Facility: CLINIC | Age: 28
End: 2018-02-06
Attending: PSYCHIATRY & NEUROLOGY
Payer: COMMERCIAL

## 2018-02-06 ENCOUNTER — CARE COORDINATION (OUTPATIENT)
Dept: PSYCHIATRY | Facility: CLINIC | Age: 28
End: 2018-02-06

## 2018-02-06 VITALS — DIASTOLIC BLOOD PRESSURE: 79 MMHG | SYSTOLIC BLOOD PRESSURE: 113 MMHG | WEIGHT: 130.4 LBS | HEART RATE: 67 BPM

## 2018-02-06 DIAGNOSIS — F42.9 OBSESSIVE-COMPULSIVE DISORDER, UNSPECIFIED TYPE: ICD-10-CM

## 2018-02-06 PROCEDURE — G0463 HOSPITAL OUTPT CLINIC VISIT: HCPCS | Mod: ZF

## 2018-02-06 RX ORDER — SERTRALINE HYDROCHLORIDE 100 MG/1
200 TABLET, FILM COATED ORAL DAILY
Qty: 60 TABLET | Refills: 2 | Status: SHIPPED | OUTPATIENT
Start: 2018-02-06 | End: 2018-05-08

## 2018-02-06 RX ORDER — RISPERIDONE 0.25 MG/1
0.25 TABLET ORAL 2 TIMES DAILY
Qty: 60 TABLET | Refills: 2 | Status: SHIPPED | OUTPATIENT
Start: 2018-02-06 | End: 2018-04-26

## 2018-02-06 RX ORDER — SERTRALINE HYDROCHLORIDE 100 MG/1
200 TABLET, FILM COATED ORAL DAILY
Qty: 30 TABLET | Refills: 2 | Status: SHIPPED | OUTPATIENT
Start: 2018-02-06 | End: 2018-02-06

## 2018-02-06 ASSESSMENT — PAIN SCALES - GENERAL: PAINLEVEL: NO PAIN (0)

## 2018-02-06 NOTE — NURSING NOTE
Chief Complaint   Patient presents with     Recheck Medication     OCD     Reviewed allergies, medications, pharmacy and smoking status.      Obtained weight, pain level, blood pressure and heart rate

## 2018-02-06 NOTE — MR AVS SNAPSHOT
After Visit Summary   2/6/2018    Corie Molina    MRN: [id number]           Patient Information     Date Of Birth          1990        Visit Information        Provider Department      2/6/2018 10:30 AM Alexus Goncalves MD [facility]        Today's Diagnoses     Obsessive-compulsive disorder, unspecified type          Care Instructions    1) MEDICATIONS:  - Increase Zoloft to 200 mg daily   - Continue Risperdal 0.25 mg twice daily      2) THERAPY: Continue couples and individual     3) RTC: in2  weeks with Dr. Goncalves for 30 min [facility] in [facility]. Please call Danette Ramírez RN @ [phone] with any questions or concerns.     4) OTHER:  -Patient was encouraged to bring her partner/support person to future appts  -LactMed is a good source for information on medications in breastfeeding  -www.womensmentalhealth.org is a good resource for information about psychiatric medication in pregnancy/lactation    5) CRISIS NUMBERS:   Provided routinely in [facility].  Pregnancy & Post[place] Support [place] (PPS) Helpline: [phone] (Call or Text)          Follow-ups after your visit        Your next 10 appointments already scheduled     Feb 12, 2018 12:00 PM CST   Adult Psychotherapy with [facility]   Psychiatry Clinic ([facility])    [address] [facility] [address] [phone]            Feb 20, 2018 10:00 AM CST   Women's Well-Being with Alexus Goncalves MD   Psychiatry Clinic ([facility])    [address] [facility] [id number] [phone]              Who to contact     Please call your clinic at [phone] to:    Ask questions about your health    Make or cancel appointments    Discuss your medicines    Learn about your test results    Speak to your doctor            Additional Information About Your Visit        MyChart Information     behaview is an electronic gateway  that provides easy, online access to your medical records. With Klout, you can request a clinic appointment, read your test results, renew a prescription or communicate with your care team.     To sign up for Klout visit the website at [email or web address]   You will be asked to enter the access code listed below, as well as some personal information. Please follow the directions to create your username and password.     Your access code is: [email or web address]  Expires: 2018 10:17 AM     Your access code will  in 90 days. If you need help or a new code, please contact your [facility] or call [phone] for assistance.        Care EveryWhere ID     This is your Care EveryWhere ID. This could be used by other organizations to access your [place] medical records  [id number]        Your Vitals Were     Pulse                   67            Blood Pressure from Last 3 Encounters:   18 113/79   18 119/73    Weight from Last 3 Encounters:   18 59.1 kg (130 lb 6.4 oz)   18 60 kg (132 lb 3.2 oz)              Today, you had the following     No orders found for display         Today's Medication Changes          These changes are accurate as of 18 11:59 PM.  If you have any questions, ask your nurse or doctor.               Start taking these medicines.        Dose/Directions    sertraline 100 MG tablet   Commonly known as:  ZOLOFT   Used for:  Obsessive-compulsive disorder, unspecified type   Started by:  Danette Ramírez RN        Dose:  200 mg   Take 2 tablets (200 mg) by mouth daily   Quantity:  60 tablet   Refills:  2         These medicines have changed or have updated prescriptions.        Dose/Directions    risperiDONE 0.25 MG tablet   Commonly known as:  risperDAL   This may have changed:    - medication strength  - how much to take   Used for:  Obsessive-compulsive disorder, unspecified type   Changed by:  Alexus Goncalves MD         Dose:  0.25 mg   Take 1 tablet (0.25 mg) by mouth 2 times daily   Quantity:  60 tablet   Refills:  2            Where to get your medicines      These medications were sent to [place] [address] [place] [facility] [place] [address] [address] [facility] [address]     Phone:  [phone]     risperiDONE 0.25 MG tablet    sertraline 100 MG tablet                Primary Care Provider Office Phone # Fax #    Stepan ONEIL MD Chris, [phone]       [address] [address]        Equal Access to Services     [facility]: Hadii aad ku hadasho Soomaali, waaxda luqadaha, qaybta kaalmada adeegyada, rupinder gunderson . So [facility] [phone].    ATENCIÓN: Si habla español, tiene a marin disposición servicios gratuitos de asistencia lingüística. [facility] [phone].    We comply with applicable federal civil rights laws and [place] laws. We do not discriminate on the basis of race, color, national origin, age, disability, sex, sexual orientation, or gender identity.            Thank you!     Thank you for choosing [facility]  for your care. Our goal is always to provide you with excellent care. Hearing back from our patients is one way we can continue to improve our services. Please take a few minutes to complete the written survey that you may receive in the mail after your visit with us. Thank you!             Your Updated Medication List - Protect others around you: Learn how to safely use, store and throw away your medicines at www.disposemymeds.org.          This list is accurate as of 2/6/18 11:59 PM.  Always use your most recent med list.                   Brand Name Dispense Instructions for use Diagnosis    norethindrone 0.35 MG per tablet    MICRONOR     Take 1 tablet by mouth daily        risperiDONE 0.25 MG tablet    risperDAL    60 tablet    Take 1 tablet (0.25 mg) by mouth 2 times daily    Obsessive-compulsive  disorder, unspecified type       sertraline 100 MG tablet    ZOLOFT    60 tablet    Take 2 tablets (200 mg) by mouth daily    Obsessive-compulsive disorder, unspecified type       UNABLE TO FIND      MEDICATION NAME: Pre  vitamins

## 2018-02-06 NOTE — PATIENT INSTRUCTIONS
1) MEDICATIONS:  - Increase Zoloft to 200 mg daily   - Continue Risperdal 0.25 mg twice daily      2) THERAPY: Continue couples and individual     3) RTC: in2  weeks with Dr. Goncalves for 30 min [facility] in [facility]. Please call Danette Ramírez RN @ [phone] with any questions or concerns.     4) OTHER:  -Patient was encouraged to bring her partner/support person to future appts  -[employer] is a good source for information on medications in breastfeeding  -www.womensmentalhealth.org is a good resource for information about psychiatric medication in pregnancy/lactation    5) CRISIS NUMBERS:   Provided routinely in S.  Pregnancy & Post[place] Support [place] (PPSM) Helpline: [phone] (Call or Text)

## 2018-02-06 NOTE — PROGRESS NOTES
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 Aimee Mariee of [facility] for evaluation of depression and OCD.      Partner/ Support: Nando ()  Children: Lamin (born 2016) and Valerie (born 2017)  Therapist: [facility]/Dr. Braga for CBT (sees weekly) and Sridhar Duarte of [facility] for couples therapy Q every other week (Auth to discuss on file)   PCP: Stepan Perez MD  OB-GYN: DILEEP Gregory CNM of [facility]    Pertinent Background:  OCD predating pregnancies (although only recently dx), PPD after the birth of her first child, disordered eating and intense fear of gaining weight.  Please see DA completed by Dyan Reyes PsyD, LP dated 18 for additional details of the HPI.  Psych critical item history includes suicidal ideation x1, fleeting and without intent.     Interim History                                                                                                             4, 4     The patient is a good historian and reports good treatment adherence.  She was last seen in clinic on 18 at which time no changes were made.     Since the last visit:  - \"Doing a bit better.\"  - Ego-dystonic obsessions have improved in frequency (not every day) and intensity (no longer involving thoughts of her children) since reduction in Risperdal. When they occur, obsession include cleanliness and order. Fatigue has also improved with Risperidone reducition.  - Still describes ongoing sx of depression. Denies SI since her one, fleeting episode on .  - Notes stress and continued uptick in OCD sx with beginning her exposure work in CBT. Particularly worried about what she will make for dinner " "tonight as part of her CBT homework is to not recipe plan.   - Has had to cut out dairy as recommended by her pediatrician in efforts to target sx of irritability in Valerie. Describes she is \"definitely obsessing about this\" to which Nando agrees.  - Inquires about the safety of having 1 beer at an upcoming [employer] social group. States she \"doesn't really want to,\" given heightened concerns of alcohol use in BF, but may feel pressured to do so if others are drinking.  - BF is going well.  - Would like to maintain her RIsperdal at current dose and increase her Zoloft as previously discussed today. Tolerating both well with possible SE of dec libido, although she notes this is likely multifactorial and that it has predated her use of medications.   - Per Nando, corroborates improvement noted with reduction in Risperdal. Denies concern for imminent danger.     Recent Symptoms:   Depression:  depressed mood, anhedonia, low energy, excessive guilt and overwhelmed  Elevated:  none  Psychosis:  none  Anxiety:  social anxiety, obsessions [improved in frequency and intensity as above], compulsions [abstaining from recipie planning as above] and nervous/overwhelmed  Panic Attack:  none  Eating Disorder: no current restricting/binging/purging although food-related obsessions and fear of weight gain as above     Recent Substance Use:  very rare use of alcohol (1 beer every 3 months)    Reproductive Plans: Not taking BC at this time (feels it negatively affects her mood) although not wanting another child at this time. Not currently having sex.        Social/ Family History                                  [per patient report]                                              1,1   FINANCIAL SUPPORT- family or friend       CHILDREN- as above       LIVING SITUATION- with  Nando and two children in [place]      EARLY HISTORY/ EDUCATION- She has one younger sister and an older brother and her parents are  and live [place] [facility]. Growing up she described her dad as  really strict  and said that she was the  perfect child.    TRAUMA HISTORY (self-report)- None  FEELS SAFE AT HOME- Yes  FAMILY HISTORY-  Corie s mother has a history of anxiety and untreated alcoholism. She thinks her father takes medication for a mental health concern, though she does not know what he is being treated for. Her sister has a history of depression and she participated in a hospital day treatment program for depression.     Psychiatric Medication Trials       Drug /  Start Date Dose (mg) Helpful Adverse Effects   DC Reason / Date   Zoloft/~Nov 2017 @ 38 wks pregnant 150 yes none current   Risperidone/~end of Dec 2017 0.25-0.5 BID yes Lightheadedness/fatigue; worsened obsessions at 0.5 mg BID dose current      Medical / Surgical History                                                                                                                   *Taking Risperdal 0.25 mg BID at 5AM and 3 PM  Patient Active Problem List   Diagnosis     Cervical high risk HPV (human papillomavirus) test positive     Depression complicating pregnancy, antepartum     OCD (obsessive compulsive disorder)     Raynaud's disease       No past surgical history on file.     PREGNANCY/ OBGYN HISTORY:  Date Event # weeks Medical Complications Psychiatric Complications Psychotropic Meds or other Fetal Exposures   1/12/16 Birth of Lamin 39 Lamin in NICU PPD none   1/2017 SAB 6   none   12/3/17 Birth of Valerie 38.6 none PPD and OCD exacerbation none   Per 1/16/18 DA by Dr. Dyan Reyes (confirmed with pt):  Corie reported a notable difference between her two pregnancy and birth experiences. She said she gained 50 lbs. with her son, and 20 lbs. with her daughter. She reported a negative experience with medical professionals and during the birth process with her son. She gave birth to him at [facility] and due to distress he was taken to the NICU for several hours without  Corie being able to hold him. She had to ask if she could see him and ask what his gender was. She reported this impacted her ability to connect with him, which was later addressed in [facility] when she was seeking treatment around the birth of her daughter. She also reported a very difficult recovery from her son s birth and that she experienced exceptional pain and difficulty walking. She reported a much more positive experience during the pregnancy and birth process with her daughter. She worked with a midwife and gave birth at [facility] noting the birth was a  wonderful experience.     Medical Review of Systems                                                                                                           2,10   A comprehensive review of systems was performed and is negative other than noted in the HPI.  Pregnant- No    Breastfeeding- Yes    Contraception- Yes, mini-pill  Allergy                                Erythromycin and Penicillins  Current Medications                                                                                                         Current Outpatient Prescriptions   Medication Sig Dispense Refill     UNABLE TO FIND MEDICATION NAME: Pre zurdo vitamins       sertraline (ZOLOFT) 100 MG tablet Take 1.5 tablets (150 mg) by mouth daily 30 tablet 1     risperiDONE (RISPERDAL) 0.5 MG tablet Take 1 tablet (0.5 mg) by mouth 2 times daily 60 tablet 1     norethindrone (MICRONOR) 0.35 MG per tablet Take 1 tablet by mouth daily       Vitals                                                                                             /79  Pulse 67  Wt 59.1 kg (130 lb 6.4 oz)   Mental Status Exam                                                                                          9, 14 cog gs   Alertness: alert  and oriented  Appearance: well groomed, appears fatigued  Behavior/Demeanor: cooperative, pleasant and calm, with good  eye contact    Speech: normal and regular rate and rhythm, quiet  Language: intact  Psychomotor: normal or unremarkable  Mood: depressed and worried  Affect: restricted, although does brighten and smile appropriately at times, tearful, was congruent to mood; was congruent to content  Thought Process/Associations: unremarkable  Thought Content:  Reports improvement in obsessions as noted above;  Denies suicidal and violent ideation and delusions  Perception:  Reports none;  Denies none  Insight: good  Judgment: excellent  Cognition: (6) does  appear grossly intact; formal cognitive testing was not done  Gait and Station: unremarkable    Labs and Data                                                                                                                   Rating Scales:  As completed in [employer]    PHQ9 Today:  Not completed today  No flowsheet data found.    No lab results found.  No lab results found.    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     Today, Zoraida describes an improvement in frequency and intensity of obsessional thoughts since reduction of Risperdal. Ongoing symptoms of depression today, no SI since  and no evidence of risk of imminent harm (see risk statement below). Risperdal will be continued without change given noticeable, intial improvement at lower dose, will not increase in the future at risk of worsening obsessional thoughts (with case reports in the literature of Risperdal's potential to exacerbate obsessions). Would anticipate  maximizing Zoloft and engagement in CBT will allow for future reduction/taper of this adjuvant treatment. We have partnered to increase her Zoloft as below to further target sx of depression and OCD. Corie elects to maximize the Zoloft prior to the other option of cross-tapering to an alternative antidepressant at this time. She will follow-up in 2 weeks and if no improvement is noted at that time, an alternative antidepressant will be considered.     The evidence-based CBT she has recently started will likely further attenuate symptoms and it is not surprising that her OCD sx have increased somewhat since starting, as if often the case when engaging in exposure therapy. Obsessions around eating and eating behaviors will be continued to be closely monitored, especially in the context of recent diet restriction recommended by her pediatrician.    Her strengths include: warm demeanor, motivation, dedication, willingness to utilize supports, intelligence.     The r/b/a of* Zoloft and risperidone in lactation/pregnancy were discussed/reviewed with Corie Molina today and at previous visit. Dr. Mariee has also previously discussed these r/b/a. We also reviewed the risks of untreated  mood and anxiety disorders to both mother and infant. We reviewed SE of Zoloft and risperidone as well as general signs/symptoms in her breastfeeding child to monitor for (lethargy, decreased suck, change in behavior). She understands she is to continue arrange assistance with childcare while taking medications that may cause sedation. Both Corie Molina and her  Nando have had their questions answered, express their understanding of the information provided, and appear to have the capacity to give informed consent regarding treatment options.    SUICIDE RISK ASSESSMENT [details described above]:  Today Corie Molina denies SI/SIB, although she experienced one, fleeting thought of SI with plan to jump from  parking ramp, without intent as discussed above on 1/19, none since that time.  She has notable risk factors for self-harm including severe anxiety and relationship conflict.  However, risk is mitigated by no h/o suicide attempt, no h/o risky impulsive behavior, no access to lethal means, describes a safety plan, h/o seeking help when needed, symptom improvement, future oriented, feeling hopeful, commitment to family, good social support   and stable housing.  Based on all available evidence she does not appear to be at imminent risk for self-harm therefore does not meet criteria for a 72-hr hold/  involuntary hospitalization.  Additional steps to minimize risk include: frequent clinic visits, anxiety/ insomnia mngmt, limit med supply and expand care team, and nurse follow-up check-in.  Safety Plan placed in AVS: No: but reviewed verbally today.    [place] Prescription Monitoring Program [] was not checked today:  not using controlled substances.   Drug Interactions:  RISPERIDONE and SERTRALINE may result in increased risperidone exposure and risk of QT interval prolongation. Patient is aware of risks. Will consider role of EKG PRN, Risperdal recently reduced.      Plan                                                                                                                             +,++     1) MEDICATIONS:  - Increase Zoloft to 200 mg daily   - Continue Risperdal 0.25 mg twice daily      2) THERAPY: Continue couples and individual     3) RTC: in 2 weeks with Dr. Goncalves for 30 min [facility] in [facility] as already scheduled. Please call Danette Ramírez RN @ [phone] with any questions or concerns.     4) OTHER:  -Patient was encouraged to bring her partner/support person to future appts  -LactMed is a good source for information on medications in breastfeeding  -www.womensmentalhealth.org is a good resource for information about psychiatric medication in pregnancy/lactation    5) CRISIS NUMBERS:   Provided  routinely in AVS.  Pregnancy & Postpartum Support [place] (PPSM) Helpline: [phone] (Call or Text)       Treatment Risk Statement:  The patient understands the risks, benefits, adverse effects and alternatives.  No medical contraindications and risks of using street drugs or alcohol is understood. Agrees to call clinic for any problems. The patient understands to call 911 or go to the nearest ED if life threatening or urgent symptoms present. Psychotropic drug interaction check was done, including changes made today.     WHODAS 2.0  TODAY total score = N/A; [a 12-item WHODAS 2.0 assessment was not completed by the pt today and/or recorded in EPIC].     PROVIDER:  Alexus Goncalves MD    I did not see this patient directly. I have reviewed the documentation and I agree with the resident's plan of care.     Ceci Martel MD

## 2018-02-06 NOTE — PROGRESS NOTES
Received a fax from [facility] in [place] asking for a dose clarification on sertraline.      Patient was increased to 200 mg daily today.  Will re-send Rx with corrected quantity for a 30 d/s.    Routed to Dr. Goncalves for FYI.

## 2018-02-09 NOTE — PROGRESS NOTES
I (Nando Braga, Ph.D., ) reviewed this note and approve of its contents. I did not staff this session in clinic.  This session will be discussed in supervision prior to the patient's next scheduled clinic appointment.

## 2018-02-12 ENCOUNTER — OFFICE VISIT (OUTPATIENT)
Dept: PSYCHIATRY | Facility: CLINIC | Age: 28
End: 2018-02-12
Attending: PSYCHOLOGIST
Payer: COMMERCIAL

## 2018-02-12 DIAGNOSIS — F40.10 SOCIAL ANXIETY DISORDER: ICD-10-CM

## 2018-02-12 DIAGNOSIS — F42.9 OBSESSIVE-COMPULSIVE DISORDER, UNSPECIFIED TYPE: Primary | ICD-10-CM

## 2018-02-12 NOTE — MR AVS SNAPSHOT
After Visit Summary   2018    Corie Molina    MRN: [id number]           Patient Information     Date Of Birth          1990        Visit Information        Provider Department      2018 12:00 PM [facility]        Today's Diagnoses     Obsessive-compulsive disorder, unspecified type    -  1    Social anxiety disorder        Depression, postpartum, postpartum condition           Follow-ups after your visit        Your next 10 appointments already scheduled     2018 12:00 PM CST   Adult Psychotherapy with [facility]   Psychiatry Clinic ([facility])    [address]  [address] [facility] [id number] [phone]            2018 10:00 AM CST   Women's Well-Being with Alexus Goncalves MD   Psychiatry Clinic ([facility])    [address] [facility] [id number] [phone]              Who to contact     Please call your clinic at [phone] to:    Ask questions about your health    Make or cancel appointments    Discuss your medicines    Learn about your test results    Speak to your doctor            Additional Information About Your Visit        MyChart Information     [employer] is an electronic gateway that provides easy, online access to your medical records. With [employer], you can request a clinic appointment, read your test results, renew a prescription or communicate with your care team.     To sign up for [employer] visit the website at [email or web address]   You will be asked to enter the access code listed below, as well as some personal information. Please follow the directions to create your username and password.     Your access code is: [email or web address]  Expires: 2018 10:17 AM     Your access code will  in 90 days. If you need help or a new code, please contact your [facility] or call  [phone] for assistance.        Care EveryWhere ID     This is your Care EveryWhere ID. This could be used by other organizations to access your [place] medical records  [id number]         Blood Pressure from Last 3 Encounters:   02/06/18 113/79   01/23/18 119/73    Weight from Last 3 Encounters:   02/06/18 59.1 kg (130 lb 6.4 oz)   01/23/18 60 kg (132 lb 3.2 oz)              Today, you had the following     No orders found for display       Primary Care Provider Office Phone # Fax #    Stepan CLARICE Perez MD, [phone]       [place] [address] [facility] [address]        Equal Access to Services     GIULIA DOTY : Hadii leda guo hadasho Soomaali, waaxda luqadaha, qaybta kaalmada adeegyada, waxmontana raiin alana gunderson . So [facility] [phone].    ATENCIÓN: Si habla español, tiene a marin disposición servicios gratuitos de asistencia lingüística. Ll[facility] [phone].    We comply with applicable federal civil rights laws and [place] laws. We do not discriminate on the basis of race, color, national origin, age, disability, sex, sexual orientation, or gender identity.            Thank you!     Thank you for choosing PSYCHIATRY CLINIC  for your care. Our goal is always to provide you with excellent care. Hearing back from our patients is one way we can continue to improve our services. Please take a few minutes to complete the written survey that you may receive in the mail after your visit with us. Thank you!             Your Updated Medication List - Protect others around you: Learn how to safely use, store and throw away your medicines at www.disposemymeds.org.          This list is accurate as of 2/12/18 11:59 PM.  Always use your most recent med list.                   Brand Name Dispense Instructions for use Diagnosis    norethindrone 0.35 MG per tablet    MICRONOR     Take 1 tablet by mouth daily        risperiDONE 0.25 MG tablet    risperDAL    60  tablet    Take 1 tablet (0.25 mg) by mouth 2 times daily    Obsessive-compulsive disorder, unspecified type       sertraline 100 MG tablet    ZOLOFT    60 tablet    Take 2 tablets (200 mg) by mouth daily    Obsessive-compulsive disorder, unspecified type       UNABLE TO FIND      MEDICATION NAME: Pre  vitamins

## 2018-02-14 NOTE — PROGRESS NOTES
"MENTAL HEALTH PROGRESS NOTE   PROCEDURE: 60-minute psychotherapy session   SESSION NUMBER: 4  DSM-5 DIAGNOSIS: F42.2 Mixed obsessional thoughts and acts; F40.10 Social anxiety disorder, F53 Depression, postpartium    Individual therapy from 12:00pm-1:00pm. The client arrived on time for the 60 minute session. Rapelje was set to review homework assignments and discuss new exposure assignments. Client noted effective habituation to most of the homework assignments over the past week. She noted a reduction in anxiety associated with cooking without following a recipe, not taking inventory of her kitchen cabinets or closets, and allowing food to go bad over the week on her counter. She noted some trouble habituating to \"strange food pairings\". As such, we developed an additional exposure hierarchy to determine which food pairings cause the most distress versus the least, and the client will continue to engage in these exposures over the course of the next week. Additionally, homework was assigned to randomize and make imperfect her routines . Plan to follow up next week and review treatment plan.  "

## 2018-02-19 ENCOUNTER — OFFICE VISIT (OUTPATIENT)
Dept: PSYCHIATRY | Facility: CLINIC | Age: 28
End: 2018-02-19
Attending: PSYCHOLOGIST
Payer: COMMERCIAL

## 2018-02-19 DIAGNOSIS — F42.9 OBSESSIVE-COMPULSIVE DISORDER, UNSPECIFIED TYPE: Primary | ICD-10-CM

## 2018-02-19 DIAGNOSIS — F40.10 SOCIAL ANXIETY DISORDER: ICD-10-CM

## 2018-02-19 NOTE — MR AVS SNAPSHOT
After Visit Summary   2018    Corie Molina    MRN: [id number]           Patient Information     Date Of Birth          1990        Visit Information        Provider Department      2018 12:00 PM [facility]        Today's Diagnoses     Obsessive-compulsive disorder, unspecified type    -  1    Social anxiety disorder        Depression, postpartum, postpartum condition           Follow-ups after your visit        Your next 10 appointments already scheduled     2018 12:00 PM CST   Adult Psychotherapy with Honey Shearer   Psychiatry Clinic ([facility])    [address]  [address] [facility] [address] [phone]            Mar 13, 2018  8:30 AM CDT   Women's Well-Being with Alexus Goncalves MD   Psychiatry Clinic ([facility])    [address] [facility] [address] [phone]              Who to contact     Please call your clinic at [phone] to:    Ask questions about your health    Make or cancel appointments    Discuss your medicines    Learn about your test results    Speak to your doctor            Additional Information About Your Visit        MyChart Information     mylearnadfriendt is an electronic gateway that provides easy, online access to your medical records. With Amicus, you can request a clinic appointment, read your test results, renew a prescription or communicate with your care team.     To sign up for mylearnadfriendt visit the website at [email or web address]   You will be asked to enter the access code listed below, as well as some personal information. Please follow the directions to create your username and password.     Your access code is: [email or web address]  Expires: 2018 10:17 AM     Your access code will  in 90 days. If you need help or a new code, please contact your [facility] or call  [phone] for assistance.        Care EveryWhere ID     This is your Care EveryWhere ID. This could be used by other organizations to access your [place] medical records  [id number]         Blood Pressure from Last 3 Encounters:   02/20/18 115/69   02/06/18 113/79   01/23/18 119/73    Weight from Last 3 Encounters:   02/20/18 59 kg (130 lb)   02/06/18 59.1 kg (130 lb 6.4 oz)   01/23/18 60 kg (132 lb 3.2 oz)              Today, you had the following     No orders found for display       Primary Care Provider Office Phone # Fax #    tamar CLARICE Perez MD, [phone]       [address] [address]        Equal Access to Services     GIULIA DOTY : Hadii leda guo hadasho Soomaali, waaxda luqadaha, qaybta kaalmada adeegyada, waxmontana juarez hayangela gunderson . So [facility] [phone].    ATENCIÓN: Si habla español, tiene a marin disposición servicios gratuitos de asistencia lingüística. Armida al [phone].    We comply with applicable federal civil rights laws and [place] laws. We do not discriminate on the basis of race, color, national origin, age, disability, sex, sexual orientation, or gender identity.            Thank you!     Thank you for choosing [facility]RY CLINIC  for your care. Our goal is always to provide you with excellent care. Hearing back from our patients is one way we can continue to improve our services. Please take a few minutes to complete the written survey that you may receive in the mail after your visit with us. Thank you!             Your Updated Medication List - Protect others around you: Learn how to safely use, store and throw away your medicines at www.disposemymeds.org.          This list is accurate as of 2/19/18 11:59 PM.  Always use your most recent med list.                   Brand Name Dispense Instructions for use Diagnosis    norethindrone 0.35 MG per tablet    MICRONOR     Take 1 tablet by mouth daily         risperiDONE 0.25 MG tablet    risperDAL    60 tablet    Take 1 tablet (0.25 mg) by mouth 2 times daily    Obsessive-compulsive disorder, unspecified type       sertraline 100 MG tablet    ZOLOFT    60 tablet    Take 2 tablets (200 mg) by mouth daily    Obsessive-compulsive disorder, unspecified type       UNABLE TO FIND      MEDICATION NAME: Pre  vitamins

## 2018-02-19 NOTE — PROGRESS NOTES
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 The client arrived on time for the 60 minute session. [place] was set to review treatment plan, discuss homework assignments over the past week, and discuss new exposure assignments. Client reported a more difficult week than previous weeks due to the distress caused by food-pairing related exposure assignments. While she experienced successful habituation to some food-pairing exposures, she reported severe distress during a couple of particular exposures, following which engaged in disordered eating following the exposure (e.g., binging on chocolate after one exposure, avoiding eating lunch due to discomfort). The writer assessed for resurfacing of other past eating disorder behavior, but the client noted that these where isolated incidents. We discussed the importance of achieving activation through exposures without over-extending the client, and adjusted food-pairing assignments for the upcoming week to ensure anxiety would not lead to disordered eating. Other than her food-pairing exposures, the client reported successful and quick habituation to randomized routines and intentionally making things imperfect. Homework this week includes continuing past exposures as well as creating and leaving messes in the client's house. Plan to follow up next week.

## 2018-02-20 ENCOUNTER — OFFICE VISIT (OUTPATIENT)
Dept: PSYCHIATRY | Facility: CLINIC | Age: 28
End: 2018-02-20
Attending: PSYCHIATRY & NEUROLOGY
Payer: COMMERCIAL

## 2018-02-20 VITALS — WEIGHT: 130 LBS | HEART RATE: 67 BPM | DIASTOLIC BLOOD PRESSURE: 69 MMHG | SYSTOLIC BLOOD PRESSURE: 115 MMHG

## 2018-02-20 DIAGNOSIS — F40.10 SOCIAL ANXIETY DISORDER: ICD-10-CM

## 2018-02-20 DIAGNOSIS — F42.9 OBSESSIVE-COMPULSIVE DISORDER, UNSPECIFIED TYPE: Primary | ICD-10-CM

## 2018-02-20 PROCEDURE — G0463 HOSPITAL OUTPT CLINIC VISIT: HCPCS | Mod: ZF

## 2018-02-20 ASSESSMENT — PAIN SCALES - GENERAL: PAINLEVEL: NO PAIN (0)

## 2018-02-20 NOTE — MR AVS SNAPSHOT
After Visit Summary   2/20/2018    Corie Molina    MRN: [id number]           Patient Information     Date Of Birth          1990        Visit Information        Provider Department      2/20/2018 10:00 AM Alexus Goncalves [facility]        Care Instructions    1) MEDICATIONS:  - Continue Zoloft to 200 mg daily   - Continue Risperdal 0.25 mg twice daily      2) THERAPY: Continue couples and individual     3) RTC: in 4 weeks with Dr. Goncalves for 30 min MFU in [facility] as already scheduled. Please call Danette Ramírez RN @ [phone] with any questions or concerns.     4) OTHER:  -Patient was encouraged to bring her partner/support person to future appts  -Gipis is a good source for information on medications in breastfeeding  -www.womensmentalhealth.org is a good resource for information about psychiatric medication in pregnancy/lactation    5) CRISIS NUMBERS:   Provided routinely in AVS.  Pregnancy & Postpartum Support [place] (PPSM) Helpline: [phone] (Call or Text)    Thank you for coming to the PSYCHIATRY CLINIC.    Lab Testing:  If you had lab testing today and your results are reassuring or normal they will be mailed to you or sent through Mobivery within 7 days.   If the lab tests need quick action we will call you with the results.  The phone number we will call with results is # [phone] (home) . If this is not the best number please call our clinic and change the number.    Medication Refills:  If you need any refills please call your pharmacy and they will contact us. Our fax number for refills is [phone]. Please allow three business for refill processing.   If you need to  your refill at a new pharmacy, please contact the new pharmacy directly. The new pharmacy will help you get your medications transferred.     Scheduling:  If you have any concerns about today's visit or wish to schedule another appointment please call our office during normal  business hours [phone] (8-5:00 M-F)    Contact Us:  Please call [phone] during business hours (8-5:00 M-F).  If after clinic hours, or on the weekend, please call  [phone].    Financial Assistance [phone]  [facility] Billing [phone]  [facility] Billing [phone]  Medical Records [phone]      MENTAL HEALTH CRISIS NUMBERS:  [facility]:   [facility] - [phone]   Crisis Residence [facility] Residence - [phone]   Walk-In Counseling Center [facility] - [phone]   COPE 24/7 [place] Mobile Team for Adults - [[phone]]; Child - [[phone]]     Crisis Connection - [phone]     [facility]:   [facility] - [phone]   Walk-in counseling [facility] - [phone]   Walk-in counseling [facility] - [phone]   Crisis Residence [facility] - [phone]   Urgent Care Adult Mental Health:   --Drop-in, 24/7 crisis line, and [place] Mobile Team [[phone]]    CRISIS TEXT LINE: Text 741-274 from anywhere, anytime, any crisis 24/7;    OR SEE www.crisistextline.org     Poison Control Center - [phone]    CHILD: [facility] needs assessment team - [phone]     [facility] - [phone]; or [employer] Lifeline - [phone]    If you have a medical emergency please call 911or go to the nearest ER.                    _____________________________________________    Again thank you for choosing PSYCHIATRY CLINIC and please let us know how we can best partner with you to improve you and your family's health.  You may be receiving a survey in the mail regarding this appointment. We would love to have your feedback, both positive and negative, so please fill out the survey and return it using the provided envolpe. The survey is done by an external company, so your answers are anonymous.             Follow-ups after your visit        Follow-up notes from your  care team     Return in about 4 weeks (around 3/20/2018).      Your next 10 appointments already scheduled     2018 12:00 PM CST   Adult Psychotherapy with [facility]   Psychiatry Clinic ([facility])    [address]  [address] [facility] [address]   [phone]            Mar 13, 2018  8:30 AM CDT   Women's Well-Being with Alexus Goncalves MD   Psychiatry Clinic ([facility])    [address] [facility] [address] [phone]              Who to contact     Please call your clinic at [phone] to:    Ask questions about your health    Make or cancel appointments    Discuss your medicines    Learn about your test results    Speak to your doctor            Additional Information About Your Visit        MyChart Information     Source MDxt is an electronic gateway that provides easy, online access to your medical records. With Jack Robie, you can request a clinic appointment, read your test results, renew a prescription or communicate with your care team.     To sign up for Jack Robie visit the website at [email or web address]   You will be asked to enter the access code listed below, as well as some personal information. Please follow the directions to create your username and password.     Your access code is: [email or web address]  Expires: 2018 10:17 AM     Your access code will  in 90 days. If you need help or a new code, please contact your [facility] or call [phone] for assistance.        Care EveryWhere ID     This is your Care EveryWhere ID. This could be used by other organizations to access your [place] medical records  [id number]        Your Vitals Were     Pulse                   67            Blood Pressure from Last 3 Encounters:   18 115/69   18 113/79   18 119/73    Weight from Last 3 Encounters:   18 59 kg (130 lb)    18 59.1 kg (130 lb 6.4 oz)   18 60 kg (132 lb 3.2 oz)              Today, you had the following     No orders found for display       Primary Care Provider Office Phone # Fax #    Stepan ONEIL MD Chris, [phone]       [place] [address] [facility] [address]        Equal Access to Services     GIULIA DOTY : Hadii aad ku hadasho Soomaali, waaxda luqadaha, qaybta kaalmada adeegyada, waxay idiin hayaan adeeg kharash la'aan ah. So [facility] [phone].    ATENCIÓN: Si komal flynn, tiene a marin disposición servicios gratuitos de asistencia lingüística. Armida al [phone].    We comply with applicable federal civil rights laws and [place] laws. We do not discriminate on the basis of race, color, national origin, age, disability, sex, sexual orientation, or gender identity.            Thank you!     Thank you for choosing PS[facility]ATRY CLINIC  for your care. Our goal is always to provide you with excellent care. Hearing back from our patients is one way we can continue to improve our services. Please take a few minutes to complete the written survey that you may receive in the mail after your visit with us. Thank you!             Your Updated Medication List - Protect others around you: Learn how to safely use, store and throw away your medicines at www.disposemymeds.org.          This list is accurate as of 18 10:36 AM.  Always use your most recent med list.                   Brand Name Dispense Instructions for use Diagnosis    norethindrone 0.35 MG per tablet    MICRONOR     Take 1 tablet by mouth daily        risperiDONE 0.25 MG tablet    risperDAL    60 tablet    Take 1 tablet (0.25 mg) by mouth 2 times daily    Obsessive-compulsive disorder, unspecified type       sertraline 100 MG tablet    ZOLOFT    60 tablet    Take 2 tablets (200 mg) by mouth daily    Obsessive-compulsive disorder, unspecified type       UNABLE TO FIND      MEDICATION NAME: Pre   vitamins

## 2018-02-20 NOTE — PATIENT INSTRUCTIONS
1) MEDICATIONS:  - Continue Zoloft to 200 mg daily   - Continue Risperdal 0.25 mg twice daily      2) THERAPY: Continue couples and individual     3) RTC: in 4 weeks with Dr. Goncalves for 30 min MFU in [facility] as already scheduled. Please call Danette Ramírez RN @ [phone] with any questions or concerns.     4) OTHER:  -Patient was encouraged to bring her partner/support person to future appts  -Be[employer]Ex is a good source for information on medications in breastfeeding  -www.womensmentalhealth.org is a good resource for information about psychiatric medication in pregnancy/lactation    5) CRISIS NUMBERS:   Provided routinely in AVS.  Pregnancy & [place] Support [place] (PPS[place]) Helpline: [phone] (Call or Text)    Thank you for coming to the PSYCHIATRY CLINIC.    Lab Testing:  If you had lab testing today and your results are reassuring or normal they will be mailed to you or sent through Trampoline Systems within 7 days.   If the lab tests need quick action we will call you with the results.  The phone number we will call with results is # [phone] (home) . If this is not the best number please call our clinic and change the number.    Medication Refills:  If you need any refills please call your pharmacy and they will contact us. Our fax number for refills is [phone]. Please allow three business for refill processing.   If you need to  your refill at a new pharmacy, please contact the new pharmacy directly. The new pharmacy will help you get your medications transferred.     Scheduling:  If you have any concerns about today's visit or wish to schedule another appointment please call our office during normal business hours [phone] (8-5:00 M-F)    Contact Us:  Please call [phone] during business hours (8-5:00 M-F).  If after clinic hours, or on the weekend, please call  [phone].    Financial Assistance [phone]  [facility] Billing [phone]  [place] Billing [phone]  Medical Records  [phone]      MENTAL HEALTH CRISIS NUMBERS:  [facility]:   [facility] - [phone]   Crisis Residence [facility] - [phone]   Walk-In Counseling Center [facility] - [phone]   COPE 24/7 [place] Mobile Team for Adults - [[phone]]; Child - [[phone]]     Crisis Connection - [phone]     [facility] - [phone]   Walk-in counseling [facility] - [phone]   Walk-in counseling [facility] - [phone]   Crisis Residence [facility] Residence - [phone]   Urgent Care Adult Mental Health:   --Drop-in, 24/7 crisis line, and [place] Mobile Team [[phone]]    CRISIS TEXT LINE: Text 752-643 from anywhere, anytime, any crisis 24/7;    OR SEE www.crisistextline.org     Poison Control Center - [phone]    CHILD: [facility] needs assessment team - [phone]     [facility] Lifeline - [phone]; or [employer] Lifeline - [phone]    If you have a medical emergency please call 911or go to the nearest ER.                    _____________________________________________    Again thank you for choosing PSYCHIATRY CLINIC and please let us know how we can best partner with you to improve you and your family's health.  You may be receiving a survey in the mail regarding this appointment. We would love to have your feedback, both positive and negative, so please fill out the survey and return it using the provided envolpe. The survey is done by an external company, so your answers are anonymous.

## 2018-02-22 NOTE — PROGRESS NOTES
I (Nando Braga, Ph.D., ) reviewed this note and agree with its contents.  I did not staff this session in clinic.  This session will be discussed in supervision prior to the patient's next scheduled clinic appointment.

## 2018-02-26 ENCOUNTER — OFFICE VISIT (OUTPATIENT)
Dept: PSYCHIATRY | Facility: CLINIC | Age: 28
End: 2018-02-26
Payer: COMMERCIAL

## 2018-02-26 DIAGNOSIS — F40.10 SOCIAL ANXIETY DISORDER: ICD-10-CM

## 2018-02-26 DIAGNOSIS — F42.9 OBSESSIVE-COMPULSIVE DISORDER, UNSPECIFIED TYPE: Primary | ICD-10-CM

## 2018-02-26 NOTE — MR AVS SNAPSHOT
After Visit Summary   2018    Corie Molina    MRN: [id number]           Patient Information     Date Of Birth          1990        Visit Information        Provider Department      2018 12:00 PM [facility]        Today's Diagnoses     Obsessive-compulsive disorder, unspecified type    -  1    Social anxiety disorder        Depression, postpartum, postpartum condition           Follow-ups after your visit        Your next 10 appointments already scheduled     Mar 12, 2018 12:00 PM CDT   Adult Psychotherapy with Honey Shearer   Psychiatry Clinic ([facility])    [place] [id number]  [facility] [address] [address] [phone]            Mar 13, 2018  8:30 AM CDT   Women's Well-Being with Alexus Goncalves MD   Psychiatry Clinic ([facility])    [place] [id number]  [facility] [address] [address] [phone]              Who to contact     Please call your clinic at [phone] to:    Ask questions about your health    Make or cancel appointments    Discuss your medicines    Learn about your test results    Speak to your doctor            Additional Information About Your Visit        MyChart Information     gopogo is an electronic gateway that provides easy, online access to your medical records. With gopogo, you can request a clinic appointment, read your test results, renew a prescription or communicate with your care team.     To sign up for Socialbakerst visit the website at [email or web address]   You will be asked to enter the access code listed below, as well as some personal information. Please follow the directions to create your username and password.     Your access code is: [email or web address]  Expires: 2018 10:17 AM     Your access code will  in 90 days. If you need help or a new code, please contact your [facility] or  call [phone] for assistance.        Care EveryWhere ID     This is your Care EveryWhere ID. This could be used by other organizations to access your [place] medical records  [id number]         Blood Pressure from Last 3 Encounters:   02/20/18 115/69   02/06/18 113/79   01/23/18 119/73    Weight from Last 3 Encounters:   02/20/18 59 kg (130 lb)   02/06/18 59.1 kg (130 lb 6.4 oz)   01/23/18 60 kg (132 lb 3.2 oz)              Today, you had the following     No orders found for display       Primary Care Provider Office Phone # Fax #    tamar CLARICE Perez MD, [phone]       [place] [address] [facility] [address]        Equal Access to Services     GIULIA DOTY : Hadii aad sari hadasho Soomaali, waaxda luqadaha, qaybta kaalmada adeegyada, waxay ceceliain hayangela gunderson . So [facility] [phone].    ATENCIÓN: Si habla español, tiene a marin disposición servicios gratuitos de asistencia lingüística. Armida al [phone].    We comply with applicable federal civil rights laws and [place] laws. We do not discriminate on the basis of race, color, national origin, age, disability, sex, sexual orientation, or gender identity.            Thank you!     Thank you for choosing PS[facility]ATRY CLINIC  for your care. Our goal is always to provide you with excellent care. Hearing back from our patients is one way we can continue to improve our services. Please take a few minutes to complete the written survey that you may receive in the mail after your visit with us. Thank you!             Your Updated Medication List - Protect others around you: Learn how to safely use, store and throw away your medicines at www.disposemymeds.org.          This list is accurate as of 2/26/18 11:59 PM.  Always use your most recent med list.                   Brand Name Dispense Instructions for use Diagnosis    norethindrone 0.35 MG per tablet    MICRONOR     Take 1 tablet by mouth daily         risperiDONE 0.25 MG tablet    risperDAL    60 tablet    Take 1 tablet (0.25 mg) by mouth 2 times daily    Obsessive-compulsive disorder, unspecified type       sertraline 100 MG tablet    ZOLOFT    60 tablet    Take 2 tablets (200 mg) by mouth daily    Obsessive-compulsive disorder, unspecified type       UNABLE TO FIND      MEDICATION NAME: Pre  vitamins

## 2018-02-26 NOTE — PROGRESS NOTES
MENTAL HEALTH PROGRESS NOTE   PROCEDURE: 60-minute psychotherapy session   SESSION NUMBER: 6  DSM-5 DIAGNOSIS: F42.2 Mixed obsessional thoughts and acts; F40.10 Social anxiety disorder, F53 Depression, postpartium    Individual therapy from 12:00pm-1:00pm. The client arrived on time for the 60 minute session. [place] was set to discuss homework assignments over the past week, review slight adjustment in treatment plan regarding approach to food-related exposure assignments, and discuss new exposure assignments. Client reported a positive week, noting effective habituation to all homework assignments (including randomization of activities, purposefully keeping house and plans somewhat disordered, and eating strange food pairings from the middle of the client's hierarchy). The client was finally able to identify today that she felt uncomfortable eating large varieties of food, particularly two or more unhealthy foods, because it made her feel as though she was taking in higher caloric intake (independent of quantity), and thus feared gaining weight. As such, a new exposure assignment was introduced this week, namely to eat a large variety of many small snacks throughout the day. Additionally, the client will continue to practice keeping her house messy and her daily plans random.  Plan to follow up next week.

## 2018-03-05 ENCOUNTER — OFFICE VISIT (OUTPATIENT)
Dept: PSYCHIATRY | Facility: CLINIC | Age: 28
End: 2018-03-05
Attending: PSYCHOLOGIST
Payer: COMMERCIAL

## 2018-03-05 DIAGNOSIS — F40.10 SOCIAL ANXIETY DISORDER: ICD-10-CM

## 2018-03-05 DIAGNOSIS — F42.9 OBSESSIVE-COMPULSIVE DISORDER, UNSPECIFIED TYPE: Primary | ICD-10-CM

## 2018-03-05 NOTE — MR AVS SNAPSHOT
After Visit Summary   3/5/2018    Corie Molina    MRN: [id number]           Patient Information     Date Of Birth          1990        Visit Information        Provider Department      3/5/2018 12:00 PM [facility]        Today's Diagnoses     Obsessive-compulsive disorder, unspecified type    -  1    Social anxiety disorder        Depression, postpartum, postpartum condition           Follow-ups after your visit        Your next 10 appointments already scheduled     Mar 12, 2018 12:00 PM CDT   Adult Psychotherapy with [facility]   Psychiatry Clinic ([facility])    [place] [id number]  [facility] [address] [address] [phone]            Mar 13, 2018  8:30 AM CDT   Women's Well-Being with Alexus Goncalves MD   Psychiatry Clinic ([facility])    [place] [id number]  [facility] [address] [phone]              Who to contact     Please call your clinic at [phone] to:    Ask questions about your health    Make or cancel appointments    Discuss your medicines    Learn about your test results    Speak to your doctor            Additional Information About Your Visit        MyChart Information     iCurrent is an electronic gateway that provides easy, online access to your medical records. With iCurrent, you can request a clinic appointment, read your test results, renew a prescription or communicate with your care team.     To sign up for GenomeQuestt visit the website at [email or web address]   You will be asked to enter the access code listed below, as well as some personal information. Please follow the directions to create your username and password.     Your access code is: [email or web address]  Expires: 2018 10:17 AM     Your access code will  in 90 days. If you need help or a new code, please contact your [facility] or call  [phone] for assistance.        Care EveryWhere ID     This is your Care EveryWhere ID. This could be used by other organizations to access your [place] medical records  [id number]         Blood Pressure from Last 3 Encounters:   02/20/18 115/69   02/06/18 113/79   01/23/18 119/73    Weight from Last 3 Encounters:   02/20/18 59 kg (130 lb)   02/06/18 59.1 kg (130 lb 6.4 oz)   01/23/18 60 kg (132 lb 3.2 oz)              Today, you had the following     No orders found for display       Primary Care Provider Office Phone # Fax #    tamar CLARICE Perez MD, [phone]       [address] [address]        Equal Access to Services     GIULIA DOTY : Hadii leda guo hadasho Soomaali, waaxda luqadaha, qaybta kaalmada adeegyada, waxmontana juarez hayangela gunderson . So [facility] [place] [phone].    ATENCIÓN: Si habla español, tiene a marin disposición servicios gratuitos de asistencia lingüística. Armida al [phone].    We comply with applicable federal civil rights laws and [place] laws. We do not discriminate on the basis of race, color, national origin, age, disability, sex, sexual orientation, or gender identity.            Thank you!     Thank you for choosing [facility]  for your care. Our goal is always to provide you with excellent care. Hearing back from our patients is one way we can continue to improve our services. Please take a few minutes to complete the written survey that you may receive in the mail after your visit with us. Thank you!             Your Updated Medication List - Protect others around you: Learn how to safely use, store and throw away your medicines at www.disposemymeds.org.          This list is accurate as of 3/5/18 11:59 PM.  Always use your most recent med list.                   Brand Name Dispense Instructions for use Diagnosis    norethindrone 0.35 MG per tablet    MICRONOR     Take 1 tablet by mouth daily         risperiDONE 0.25 MG tablet    risperDAL    60 tablet    Take 1 tablet (0.25 mg) by mouth 2 times daily    Obsessive-compulsive disorder, unspecified type       sertraline 100 MG tablet    ZOLOFT    60 tablet    Take 2 tablets (200 mg) by mouth daily    Obsessive-compulsive disorder, unspecified type       UNABLE TO FIND      MEDICATION NAME: Pre  vitamins

## 2018-03-05 NOTE — PROGRESS NOTES
"MENTAL HEALTH PROGRESS NOTE   PROCEDURE: 60-minute psychotherapy session   SESSION NUMBER: 7  DSM-5 DIAGNOSIS: F42.2 Mixed obsessional thoughts and acts; F40.10 Social anxiety disorder, F53 Depression, postpartium    Individual therapy from 12:00pm-1:00pm. The client arrived on time for the 60 minute session. [place] was set to discuss homework assignments over the past week and discuss new exposure assignments. Client reported successful habituation to most exposure assignments despite experiencing a stressful family discord this week (following her father's firing from his job). Despite some urges to revert to old behaviors, she continued to engage in exposures. The client continues to have some trouble with food-related exposures, although notes more habituation that last week with \"strange food pairings.\" She still notes stress and rumination related to fears of gaining weight, suggesting eating-disorder symptomology. Homework was  Assigned to continue food exposures from last week, not participate in bedtime at least 3 times this week, and utilize a  that is not her parents or inlaws. Plan to follow up next week.  "

## 2018-03-08 NOTE — PROGRESS NOTES
I (Nando Braga, Ph.D., ) reviewed this note and agree with its contents.  I did not staff this session in clinic. This session will be discussed in supervision prior to this patient's next scheduled clinic appointment.

## 2018-03-12 ENCOUNTER — OFFICE VISIT (OUTPATIENT)
Dept: PSYCHIATRY | Facility: CLINIC | Age: 28
End: 2018-03-12
Attending: PSYCHOLOGIST
Payer: COMMERCIAL

## 2018-03-12 DIAGNOSIS — F42.9 OBSESSIVE-COMPULSIVE DISORDER, UNSPECIFIED TYPE: Primary | ICD-10-CM

## 2018-03-12 DIAGNOSIS — F40.10 SOCIAL ANXIETY DISORDER: ICD-10-CM

## 2018-03-12 NOTE — PROGRESS NOTES
MENTAL HEALTH PROGRESS NOTE   PROCEDURE: 60-minute psychotherapy session   SESSION NUMBER: 8  DSM-5 DIAGNOSIS: F42.2 Mixed obsessional thoughts and acts; F40.10 Social anxiety disorder, F53 Depression, postpartium    Individual therapy from 12:00pm-1:00pm. The client arrived on time for the 60 minute session. [place] was set to discuss homework assignments, review improvement in symptoms since beginning of treatment, and begin discussing termination and referral for eating disorder treatment. Client reported successful habituation to all homework, including assignments focused on food, noting changes in attitude towards eating habits (i.e., wanting to eat healthy, but does not feel external pressure to do so or out of control when does not). Client noted one additional symptom of OCD not previously disclosed due to lack of insight and shame: she reported picking at blemishes on her face and the back of her arms for 5-20 minutes a day (down from 60 minutes at it's peak). Client was assigned to continue food exposures from last week, utilize a  that is not her parents or in-laws, and finally to no pick at her blemishes at all. Plan to follow up next week for final appointment.

## 2018-03-12 NOTE — MR AVS SNAPSHOT
After Visit Summary   3/12/2018    Corie Molina    MRN: [id number]           Patient Information     Date Of Birth          1990        Visit Information        Provider Department      3/12/2018 12:00 PM [facility]        Today's Diagnoses     Obsessive-compulsive disorder, unspecified type    -  1    Social anxiety disorder        Depression, postpartum, postpartum condition           Follow-ups after your visit        Your next 10 appointments already scheduled     Mar 26, 2018 12:00 PM CDT   Adult Psychotherapy with [facility]   Psychiatry Clinic ([facility])    [place] [id number] [address] [address] [phone]            May 08, 2018 10:00 AM CDT   Adult Med Follow UP with Alexus Goncalves MD   Psychiatry Clinic ([facility])    [place] [address] [address] [address] [phone]              Who to contact     Please call your clinic at [phone] to:    Ask questions about your health    Make or cancel appointments    Discuss your medicines    Learn about your test results    Speak to your doctor            Additional Information About Your Visit        MyChart Information     [employer] is an electronic gateway that provides easy, online access to your medical records. With [employer], you can request a clinic appointment, read your test results, renew a prescription or communicate with your care team.     To sign up for Makoondit visit the website at [email or web address]   You will be asked to enter the access code listed below, as well as some personal information. Please follow the directions to create your username and password.     Your access code is: [email or web address]  Expires: 2018 11:17 AM     Your access code will  in 90 days. If you need help or a new code, please contact your [facility] or  call [phone] for assistance.        Care EveryWhere ID     This is your Care EveryWhere ID. This could be used by other organizations to access your [place] medical records  [id number]         Blood Pressure from Last 3 Encounters:   03/13/18 120/73   02/20/18 115/69   02/06/18 113/79    Weight from Last 3 Encounters:   03/13/18 58.2 kg (128 lb 3.2 oz)   02/20/18 59 kg (130 lb)   02/06/18 59.1 kg (130 lb 6.4 oz)              Today, you had the following     No orders found for display       Primary Care Provider Office Phone # Fax #    Stepan CLARICE Perez MD, [phone]       [place] [address] [facility] [address]        Equal Access to Services     ZENAIDA [facility] HealthLEXIE : Hadii leda guo hadasho Soomaali, waaxda luqadaha, qaybta kaalmada adeegyada, waxmontana gunderson . So [facility] [phone].    ATENCIÓN: Si habla español, tiene a marin disposición servicios gratuitos de asistencia lingüística. Armida al [phone].    We comply with applicable federal civil rights laws and [place] laws. We do not discriminate on the basis of race, color, national origin, age, disability, sex, sexual orientation, or gender identity.            Thank you!     Thank you for choosing [facility]  for your care. Our goal is always to provide you with excellent care. Hearing back from our patients is one way we can continue to improve our services. Please take a few minutes to complete the written survey that you may receive in the mail after your visit with us. Thank you!             Your Updated Medication List - Protect others around you: Learn how to safely use, store and throw away your medicines at www.disposemymeds.org.          This list is accurate as of 3/12/18 11:59 PM.  Always use your most recent med list.                   Brand Name Dispense Instructions for use Diagnosis    norethindrone 0.35 MG per tablet    MICRONOR     Take 1 tablet by mouth daily         risperiDONE 0.25 MG tablet    risperDAL    60 tablet    Take 1 tablet (0.25 mg) by mouth 2 times daily    Obsessive-compulsive disorder, unspecified type       sertraline 100 MG tablet    ZOLOFT    60 tablet    Take 2 tablets (200 mg) by mouth daily    Obsessive-compulsive disorder, unspecified type       UNABLE TO FIND      MEDICATION NAME: Pre  vitamins

## 2018-03-13 ENCOUNTER — OFFICE VISIT (OUTPATIENT)
Dept: PSYCHIATRY | Facility: CLINIC | Age: 28
End: 2018-03-13
Attending: PSYCHIATRY & NEUROLOGY
Payer: COMMERCIAL

## 2018-03-13 VITALS — WEIGHT: 128.2 LBS | SYSTOLIC BLOOD PRESSURE: 120 MMHG | DIASTOLIC BLOOD PRESSURE: 73 MMHG | HEART RATE: 65 BPM

## 2018-03-13 DIAGNOSIS — F42.9 OBSESSIVE-COMPULSIVE DISORDER, UNSPECIFIED TYPE: Primary | ICD-10-CM

## 2018-03-13 PROCEDURE — G0463 HOSPITAL OUTPT CLINIC VISIT: HCPCS | Mod: ZF

## 2018-03-13 ASSESSMENT — PAIN SCALES - GENERAL: PAINLEVEL: NO PAIN (0)

## 2018-03-13 NOTE — NURSING NOTE
Chief Complaint   Patient presents with     Recheck Medication     Obsessive-compulsive disorder, unspecified type     Reviewed Allergies, Medications, Pharmacy, Smoking Status, and Pain Level    Obtained Weight, Blood Pressure, Heart Rate

## 2018-03-13 NOTE — PATIENT INSTRUCTIONS
1) MEDICATIONS:  - Continue Zoloft 200 mg daily   - Continue Risperdal 0.25 mg twice daily      2) THERAPY: Continue couples and individual. [facility] assessment as scheduled.    3) RTC: in 6-8 weeks with Dr. Goncalves for 30 min MFU in [facility]. Please call Danette Ramírez RN @ [phone] with any questions or concerns.     4) OTHER:  -Patient was encouraged to bring her partner/support person to future appts  -[employer] is a good source for information on medications in breastfeeding  -www.womensmentalhealth.org is a good resource for information about psychiatric medication in pregnancy/lactation    5) CRISIS NUMBERS:   Provided routinely in S.  Pregnancy & Post[place] Support [place] (PPSM) Helpline: [phone] (Call or Text)

## 2018-03-13 NOTE — MR AVS SNAPSHOT
After Visit Summary   3/13/2018    Corie Molina    MRN: [id number]           Patient Information     Date Of Birth          1990        Visit Information        Provider Department      3/13/2018 8:30 AM Alexus [facility]oncalves [facility]        Today's Diagnoses     Obsessive-compulsive disorder, unspecified type    -  1      Care Instructions    1) MEDICATIONS:  - Continue Zoloft 200 mg daily   - Continue Risperdal 0.25 mg twice daily      2) THERAPY: Continue couples and individual. [facility] assessment as scheduled.    3) RTC: in 6-8 weeks with Dr. Goncalves for 30 min MFU in [facility]. Please call Danette Ramírez RN @ [phone] with any questions or concerns.     4) OTHER:  -Patient was encouraged to bring her partner/support person to future appts  -[employer] is a good source for information on medications in breastfeeding  -www.womensmentalhealth.org is a good resource for information about psychiatric medication in pregnancy/lactation    5) CRISIS NUMBERS:   Provided routinely in [facility].  Pregnancy & Post[place] Support [place] (PPS) Helpline: [phone] (Call or Text)          Follow-ups after your visit        Follow-up notes from your care team     Return in about 8 weeks (around 5/8/2018).      Your next 10 appointments already scheduled     Mar 26, 2018 12:00 PM CDT   Adult Psychotherapy with [facility]   Psychiatry Clinic ([facility])    [place] [id number]  Formerly named [facility] [address] [address] [phone]            May 08, 2018 10:00 AM CDT   Adult Med Follow UP with Alexus Goncalves MD   Psychiatry Clinic ([facility])    [address]  Formerly named [facility] [address] [phone]              Who to contact     Please call your clinic at [phone] to:    Ask questions about your health    Make or cancel appointments    Discuss your medicines    Learn about your test  results    Speak to your doctor            Additional Information About Your Visit        [employer] Information     RayV is an electronic gateway that provides easy, online access to your medical records. With RayV, you can request a clinic appointment, read your test results, renew a prescription or communicate with your care team.     To sign up for RayV visit the website at [email or web address]   You will be asked to enter the access code listed below, as well as some personal information. Please follow the directions to create your username and password.     Your access code is: [email or web address]  Expires: 2018 11:17 AM     Your access code will  in 90 days. If you need help or a new code, please contact your [facility] or call [phone] for assistance.        Care EveryWhere ID     This is your Care EveryWhere ID. This could be used by other organizations to access your [place] medical records  [id number]        Your Vitals Were     Pulse                   65            Blood Pressure from Last 3 Encounters:   18 120/73   18 115/69   18 113/79    Weight from Last 3 Encounters:   18 58.2 kg (128 lb 3.2 oz)   18 59 kg (130 lb)   18 59.1 kg (130 lb 6.4 oz)              Today, you had the following     No orders found for display       Primary Care Provider Office Phone # Fax #    tamar CLARICE Perez MD, [phone]       [address] [address]        Equal Access to Services     GIULIA DOTY : Hadii aad ku hadasho Soomaali, waaxda luqadaha, qaybta kaalmada adeegyada, rupinder sanchez. So [facility] [phone].    ATENCIÓN: Si habla español, tiene a marin disposición servicios gratuitos de asistencia lingüística. Llame al [phone].    We comply with applicable federal civil rights laws and [place] laws. We do not discriminate on the basis of  race, color, national origin, age, disability, sex, sexual orientation, or gender identity.            Thank you!     Thank you for choosing [facility]  for your care. Our goal is always to provide you with excellent care. Hearing back from our patients is one way we can continue to improve our services. Please take a few minutes to complete the written survey that you may receive in the mail after your visit with us. Thank you!             Your Updated Medication List - Protect others around you: Learn how to safely use, store and throw away your medicines at www.disposemymeds.org.          This list is accurate as of 3/13/18 11:59 PM.  Always use your most recent med list.                   Brand Name Dispense Instructions for use Diagnosis    norethindrone 0.35 MG per tablet    MICRONOR     Take 1 tablet by mouth daily        risperiDONE 0.25 MG tablet    risperDAL    60 tablet    Take 1 tablet (0.25 mg) by mouth 2 times daily    Obsessive-compulsive disorder, unspecified type       sertraline 100 MG tablet    ZOLOFT    60 tablet    Take 2 tablets (200 mg) by mouth daily    Obsessive-compulsive disorder, unspecified type       UNABLE TO FIND      MEDICATION NAME: Pre  vitamins

## 2018-03-13 NOTE — PROGRESS NOTES
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 Aimee Mariee of [facility] for evaluation of depression and OCD.      Partner/ Support: Nando ()  Children: Lamin (born 2016) and Valerie (born 2017)  Therapist: [facility]/Dr. Braga for CBT (sees weekly) and Sridhar Duarte of [facility] for couples therapy Q every other week (Auth to discuss on file)   PCP: Stepan Perez MD  OB-GYN: DILEEP Gregory CNM of [facility]    Pertinent Background:  OCD predating pregnancies (although only recently dx), PPD after the birth of her first child, disordered eating and intense fear of gaining weight.  Please see DA completed by Dyan Reyes PsyD, LP dated 18 for additional details of the HPI.  Psych critical item history includes suicidal ideation x1 2018, fleeting and without intent.     Interim History                                                                                                             4, 4     The patient is a good historian and reports good treatment adherence.  She was last seen in clinic on 18 at which time no medication changes were made. She is accompanied by her mother, Deena, and children today. She is seen both with and without her mother in the room.    Since the last visit:  - \"Definitely doing better.\"   - Mood and sleep are good, anxiety much improved.  - Completing CBT next week.  - Has scheduled an intake appt at [facility] for disordered eating sx that have not responded to core CBT for anxiety/OCD and is in agreement with this recommendation.   - Also has a referral for ABC therapy for Will at [facility] (initiated by the team at [facility]).   - She denies SI since " "her fleeting episode 1/19. Denies SIB.  - Ego-dystonic obsessions continue to improve in frequency (not every day) and intensity (not involving thoughts of her children) since reduction in Risperdal and CBT. When they occur, obsession include cleanliness, order, and rules around food.   - BF is going well, an abundance of milk supply. Valerie waking x2/night to feed. She and Nando have started implementation of bottle feeding/pumping to allow him to further support Corie.  - Her mother states she is \"very proud\" of Zoraida and inquires as to how best help her in times of need    Medications:  - Would like to maintain her Risperdal at current dose and increase her Zoloft today without change, has noted benefit with both.  - Fatigue has almost resolved with Risperidone reduction.  - Tolerating both well with possible SE of dec libido, although she notes this is likely multifactorial and that it has predated her use of medications.     Recent Symptoms:   Depression:  some guilt and feelings of being overwhelmed at times  Elevated:  none  Psychosis:  none  Anxiety:  obsessions [improved in frequency and intensity as above, still some around eating] and compulsions [rules around food]  Panic Attack:  none  Eating Disorder: Some previous binge eating behavior. No current restricting/purging although food-related obsessions and fear of weight gain.     Recent Substance Use:  very rare use of alcohol (1 beer every 3 months)    Reproductive Plans: on the mini-pill. Not thinking about having more children at this time.        Social/ Family History                                  [per patient report]                                              1,1   FINANCIAL SUPPORT- family or friend       CHILDREN- as above       LIVING SITUATION- with  Nando and two children in [place]      EARLY HISTORY/ EDUCATION- She has one younger sister and an older brother and her parents are  and live in the [facility]. Growing up " she described her dad as  really strict  and said that she was the  perfect child.    TRAUMA HISTORY (self-report)- None  FEELS SAFE AT HOME- Yes  FAMILY HISTORY-  Corie s mother has a history of anxiety and untreated alcoholism. She thinks her father takes medication for a mental health concern, though she does not know what he is being treated for. Her sister has a history of depression and she participated in a hospital day treatment program for depression.     Psychiatric Medication Trials       Drug /  Start Date Dose (mg) Helpful Adverse Effects   DC Reason / Date   Zoloft/~Nov 2017 @ 38 wks pregnant 150 yes none current   Risperidone/~end of Dec 2017 0.25-0.5 BID yes Lightheadedness/fatigue; worsened obsessions at 0.5 mg BID dose current      Medical / Surgical History                                                                                                                   *Taking Risperdal 0.25 mg BID at 5AM and 3 PM  Patient Active Problem List   Diagnosis     Cervical high risk HPV (human papillomavirus) test positive     Depression complicating pregnancy, antepartum     OCD (obsessive compulsive disorder)     Raynaud's disease       No past surgical history on file.     PREGNANCY/ OBGYN HISTORY:  Date Event # weeks Medical Complications Psychiatric Complications Psychotropic Meds or other Fetal Exposures   1/12/16 Birth of Lamin 39 Lamin in NICU PPD none   1/2017 SAB 6   none   12/3/17 Birth of Valerie 38.6 none PPD and OCD exacerbation none   Per 1/16/18 DA by Dr. Dyan Reyes (confirmed with pt):  Corie reported a notable difference between her two pregnancy and birth experiences. She said she gained 50 lbs. with her son, and 20 lbs. with her daughter. She reported a negative experience with medical professionals and during the birth process with her son. She gave birth to him at [facility] and due to distress he was taken to the NICU for several hours without Corie being able to  hold him. She had to ask if she could see him and ask what his gender was. She reported this impacted her ability to connect with him, which was later addressed in [facility] when she was seeking treatment around the birth of her daughter. She also reported a very difficult recovery from her son s birth and that she experienced exceptional pain and difficulty walking. She reported a much more positive experience during the pregnancy and birth process with her daughter. She worked with a midwife and gave birth at [facility] noting the birth was a  wonderful experience.     Medical Review of Systems                                                                                                           2,10   A comprehensive review of systems was performed and is negative other than noted in the HPI.  Pregnant- No    Breastfeeding- Yes    Contraception- Yes, mini-pill  Allergy                                Erythromycin and Penicillins  Current Medications                                                                                                         Current Outpatient Prescriptions   Medication Sig Dispense Refill     UNABLE TO FIND MEDICATION NAME: Pre  vitamins       sertraline (ZOLOFT) 100 MG tablet Take 2 tablets (200 mg) by mouth daily 60 tablet 2     norethindrone (MICRONOR) 0.35 MG per tablet Take 1 tablet by mouth daily       risperiDONE (RISPERDAL) 0.25 MG tablet Take 1 tablet (0.25 mg) by mouth 2 times daily 60 tablet 2     Vitals                                                                                             /73  Pulse 65  Wt 58.2 kg (128 lb 3.2 oz)   Mental Status Exam                                                                                          9, 14 cog gs   Alertness: alert  and oriented  Appearance: well groomed, appears much less fatigued  Behavior/Demeanor: cooperative, pleasant and calm, with good  eye contact. Interacts  appropriately with children appropriately.  Speech: normal and regular rate and rhythm, quiet  Language: intact  Psychomotor: normal or unremarkable  Mood: less anxious today  Affect: restricted, although does brighten and smile appropriately often, was congruent to mood; was congruent to content  Thought Process/Associations: unremarkable  Thought Content:  Reports improvement in obsessions as noted above;  Denies suicidal and violent ideation and delusions  Perception:  Reports none;  Denies none  Insight: good  Judgment: excellent  Cognition: (6) does  appear grossly intact; formal cognitive testing was not done  Gait and Station: unremarkable    Labs and Data                                                                                                                   Rating Scales:  As completed in [employer]    PHQ9 Today:  Not completed today  No flowsheet data found.    No lab results found.  No lab results found.    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     Today, Zoraida describes a continued improvement in anxiety, frequency and intensity of obsessional thoughts since reduction of Risperdal as well as starting CBT. Depression has also continued to improve, no SI since  and no evidence of risk of imminent harm (see risk statement below). Risperdal will be continued without change, will not increase in the future at risk of worsening obsessional thoughts (with case reports in the literature of  Risperdal's potential to exacerbate obsessions) and would maximize Zoloft first if necessary. Zoloft will be continued without change given recent increase and noted improvement.     Given she is near completion of CBT and obsessions around eating and eating behaviors remain, would characterize these sx as 2/2 unspecified ED vs OCD. She has an intake at [facility] next week and can also consider CBT-E with Dr. Rudd if therapy is not part of the [facility]'s recs.    Her strengths include: warm demeanor, motivation, dedication, willingness to utilize supports, intelligence.     The r/b/a of Zoloft and risperidone in lactation have been discussed/reviewed with Corie Molina today and previously with Dr. Mariee. We also reviewed the risks of untreated  mood and anxiety disorders to both mother and infant. We reviewed SE of Zoloft and risperidone as well as general signs/symptoms in her breastfeeding child to monitor for (lethargy, decreased suck, change in behavior). We discussed that risperidone may be contributing to increased milk supply. She understands she is to continue arrange assistance with childcare while taking medications that may cause sedation. Corie Molina has had her questions answered, express her understanding of the information provided, and appears to have the capacity to give informed consent regarding treatment options.    SUICIDE RISK ASSESSMENT [details described above]:  Today Corie Molina denies SI/SIB, although she experienced one, fleeting thought of SI with plan to jump from parking ramp, without intent on , none since that time.  She has notable risk factors for self-harm including severe anxiety and relationship conflict.  However, risk is mitigated by no h/o suicide attempt, no h/o risky impulsive behavior, no access to lethal means, describes a safety plan, h/o seeking help when needed, symptom improvement, future oriented, feeling hopeful,  commitment to family, good social support   and stable housing.  Based on all available evidence she does not appear to be at imminent risk for self-harm therefore does not meet criteria for a 72-hr hold/  involuntary hospitalization.  Additional steps to minimize risk include: frequent clinic visits, anxiety/ insomnia mngmt, limit med supply and expand care team, and nurse follow-up check-in.  Safety Plan placed in AVS: No: but reviewed verbally today.    [place] Prescription Monitoring Program [] was not checked today:  not using controlled substances.   Drug Interactions:  RISPERIDONE and SERTRALINE may result in increased risperidone exposure and risk of QT interval prolongation. Patient is aware of risks. Will consider role of EKG PRN, Risperdal recently reduced.      Plan                                                                                                                             +,++   1) MEDICATIONS:  - Continue Zoloft 200 mg daily   - Continue Risperdal 0.25 mg twice daily      2) THERAPY: Continue couples and individual. [facility] assessment as scheduled. Attachment and Biobehavioral Catch-up (ABC) therapy with Will as already scheduled.    3) RTC: in 6-8 weeks with Dr. Goncalves for 30 min [facility] in [facility]. Please call Danette Ramírez RN @ [phone] with any questions or concerns.     4) OTHER:  -Patient was encouraged to bring her partner/support person to future appts  -LactMed is a good source for information on medications in breastfeeding  -www.womensmentalhealth.org is a good resource for information about psychiatric medication in pregnancy/lactation    5) CRISIS NUMBERS:   Provided routinely in AVS.  Pregnancy & Postpartum Support [place] (PPSM) Helpline: [phone] (Call or Text)       Treatment Risk Statement:  The patient understands the risks, benefits, adverse effects and alternatives.  No medical contraindications and risks of using street drugs or alcohol is understood. Agrees to  call clinic for any problems. The patient understands to call 911 or go to the nearest ED if life threatening or urgent symptoms present. Psychotropic drug interaction check was done, including changes made today.     WHODAS 2.0  TODAY total score = N/A; [a 12-item WHODAS 2.0 assessment was not completed by the pt today and/or recorded in EPIC].     PROVIDER:  Alexus Goncalves MD    Patient was not staffed in clinic today. Note will be routed and reviewed by supervisor, Dr. Martel.  I did not see this patient directly. I have reviewed the documentation and I agree with the resident's plan of care.     Ceci Martel MD

## 2018-03-26 ENCOUNTER — OFFICE VISIT (OUTPATIENT)
Dept: PSYCHIATRY | Facility: CLINIC | Age: 28
End: 2018-03-26
Payer: COMMERCIAL

## 2018-03-26 DIAGNOSIS — F40.10 SOCIAL ANXIETY DISORDER: ICD-10-CM

## 2018-03-26 DIAGNOSIS — F42.9 OBSESSIVE-COMPULSIVE DISORDER, UNSPECIFIED TYPE: Primary | ICD-10-CM

## 2018-03-26 NOTE — PROGRESS NOTES
MENTAL HEALTH PROGRESS NOTE   PROCEDURE: 60-minute psychotherapy session   SESSION NUMBER: 9  DSM-5 DIAGNOSIS: F42.2 Mixed obsessional thoughts and acts; F40.10 Social anxiety disorder, F53 Depression, postpartium    Individual therapy from 12:00pm-1:00pm. The client arrived on time for the 60 minute session. [place] was set to review homework assignments and discuss termination of therapy. The client continues to report improvement in symptoms, including reduction in skin-picking behaviors, less frequent and less severe distress associated with obsessive and intrusive thinking, and habituation to exposure assignments. The client noted 90% symptom improvement since beginning this 9-week course of treatment for OCD. We discussed ways to recognize possible resurfacing of symptoms and methods in which to address said intrusive thoughts and obsessive compulsions. The client has been referred to [facility] in [place] in order to address some lingering eating-disordered symptoms that have not been alleviated by OCD treatment (i.e., restricted food intake, intrusive thoughts about food), though already notes some improvement in these symptoms since starting exposure response prevention for OCD. Furthermore, the client will attend the [facility] (Attachment and Behavioral Catch Up) Program to address feelings of detachment from her son. Of note, the client expressed confidence in her ability to address symptoms of OCD (should they resurface in the future) with the tools she has learned in therapy.

## 2018-03-26 NOTE — MR AVS SNAPSHOT
After Visit Summary   3/26/2018    Corie Molina    MRN: [id number]           Patient Information     Date Of Birth          1990        Visit Information        Provider Department      3/26/2018 12:00 PM [facility]        Today's Diagnoses     Obsessive-compulsive disorder, unspecified type    -  1    Social anxiety disorder        Depression, postpartum, postpartum condition           Follow-ups after your visit        Your next 10 appointments already scheduled     May 08, 2018 10:00 AM CDT   Adult Med Follow UP with Alexus Goncalves MD   Psychiatry Clinic ([facility])    [address]  [address] [id number] [phone]              Who to contact     Please call your clinic at [phone] to:    Ask questions about your health    Make or cancel appointments    Discuss your medicines    Learn about your test results    Speak to your doctor            Additional Information About Your Visit        MyChart Information     Loop88t is an electronic gateway that provides easy, online access to your medical records. With [employer], you can request a clinic appointment, read your test results, renew a prescription or communicate with your care team.     To sign up for Loop88t visit the website at [email or web address]   You will be asked to enter the access code listed below, as well as some personal information. Please follow the directions to create your username and password.     Your access code is: [email or web address]  Expires: 2018 11:17 AM     Your access code will  in 90 days. If you need help or a new code, please contact your [facility] or call [phone] for assistance.        Care EveryWhere ID     This is your Care EveryWhere ID. This could be used by other organizations to access your [place] medical records  [id number]         Blood Pressure from Last  3 Encounters:   18 120/73   18 115/69   18 113/79    Weight from Last 3 Encounters:   18 58.2 kg (128 lb 3.2 oz)   18 59 kg (130 lb)   18 59.1 kg (130 lb 6.4 oz)              Today, you had the following     No orders found for display       Primary Care Provider Office Phone # Fax #    Stepan ONEIL MD Chris, [phone]       [address] [address]        Equal Access to Services     [facility]: Hadii leda guo hadasho Sopedrito, waaxda luqadaha, qaybta kaalmada mariannayaenrique, rupinder gunderson . So [facility] [phone].    ATENCIÓN: Si habla español, tiene a marin disposición servicios gratuitos de asistencia lingüística. [facility] [phone].    We comply with applicable federal civil rights laws and [place] laws. We do not discriminate on the basis of race, color, national origin, age, disability, sex, sexual orientation, or gender identity.            Thank you!     Thank you for choosing PSYCHIATRY CLINIC  for your care. Our goal is always to provide you with excellent care. Hearing back from our patients is one way we can continue to improve our services. Please take a few minutes to complete the written survey that you may receive in the mail after your visit with us. Thank you!             Your Updated Medication List - Protect others around you: Learn how to safely use, store and throw away your medicines at www.disposemymeds.org.          This list is accurate as of 3/26/18 11:59 PM.  Always use your most recent med list.                   Brand Name Dispense Instructions for use Diagnosis    norethindrone 0.35 MG per tablet    MICRONOR     Take 1 tablet by mouth daily        sertraline 100 MG tablet    ZOLOFT    60 tablet    Take 2 tablets (200 mg) by mouth daily    Obsessive-compulsive disorder, unspecified type       UNABLE TO FIND      MEDICATION NAME: Pre  vitamins

## 2018-04-26 DIAGNOSIS — F42.9 OBSESSIVE-COMPULSIVE DISORDER, UNSPECIFIED TYPE: ICD-10-CM

## 2018-04-26 RX ORDER — RISPERIDONE 0.25 MG/1
0.25 TABLET ORAL 2 TIMES DAILY
Qty: 60 TABLET | Refills: 0 | Status: SHIPPED | OUTPATIENT
Start: 2018-04-26 | End: 2018-05-08

## 2018-04-27 NOTE — TELEPHONE ENCOUNTER
Medication requested: Risperidone 0.25 mg tabs  Last refilled: 4-4-18  Qty: 60      Last seen: 3-13-18  RTC: 6-8 wks  Cancel: 0  No-show: 0  Next appt: 5-8-18    Refill decision:   Refilled for 30 days per protocol.    Kathleen M Doege RN

## 2018-05-08 ENCOUNTER — OFFICE VISIT (OUTPATIENT)
Dept: PSYCHIATRY | Facility: CLINIC | Age: 28
End: 2018-05-08
Attending: PSYCHIATRY & NEUROLOGY
Payer: COMMERCIAL

## 2018-05-08 VITALS — SYSTOLIC BLOOD PRESSURE: 109 MMHG | HEART RATE: 71 BPM | WEIGHT: 124.8 LBS | DIASTOLIC BLOOD PRESSURE: 70 MMHG

## 2018-05-08 DIAGNOSIS — F42.9 OBSESSIVE-COMPULSIVE DISORDER, UNSPECIFIED TYPE: Primary | ICD-10-CM

## 2018-05-08 DIAGNOSIS — Z79.899 ENCOUNTER FOR LONG-TERM (CURRENT) USE OF MEDICATIONS: ICD-10-CM

## 2018-05-08 PROCEDURE — G0463 HOSPITAL OUTPT CLINIC VISIT: HCPCS | Mod: ZF

## 2018-05-08 RX ORDER — RISPERIDONE 0.25 MG/1
0.25 TABLET ORAL 2 TIMES DAILY
Qty: 60 TABLET | Refills: 3 | Status: SHIPPED | OUTPATIENT
Start: 2018-05-08 | End: 2021-10-25

## 2018-05-08 RX ORDER — SERTRALINE HYDROCHLORIDE 100 MG/1
200 TABLET, FILM COATED ORAL DAILY
Qty: 60 TABLET | Refills: 3 | Status: SHIPPED | OUTPATIENT
Start: 2018-05-08

## 2018-05-08 ASSESSMENT — PAIN SCALES - GENERAL: PAINLEVEL: NO PAIN (0)

## 2018-05-08 NOTE — MR AVS SNAPSHOT
After Visit Summary   5/8/2018    Corie Molina    MRN: [id number]           Patient Information     Date Of Birth          1990        Visit Information        Provider Department      5/8/2018 10:00 AM Alexus Goncalves MD Psychiatry [facility]        Today's Diagnoses     Obsessive-compulsive disorder, unspecified type          Care Instructions    1) MEDICATIONS:  - Continue Zoloft 200 mg daily   - Continue Risperdal 0.25 mg twice daily      2) THERAPY: Continue     3) RTC: as needed with Dr. Goncalves. Please call Danette Ramírez RN @ [phone] with any questions or concerns.     4) OTHER:  -Patient was encouraged to bring her partner/support person to future appts  -M-KOPA is a good source for information on medications in breastfeeding  -www.womensmentalhealth.org is a good resource for information about psychiatric medication in pregnancy/lactation  - Dr. Goncalves will call you with psychiatry referrals for long-term care    5) CRISIS NUMBERS:   Provided routinely in [facility].  Pregnancy & [place] Support [place] (PPSM) Helpline: [phone] (Call or Text)          Follow-ups after your visit        Who to contact     Please call your clinic at [phone] to:    Ask questions about your health    Make or cancel appointments    Discuss your medicines    Learn about your test results    Speak to your doctor            Additional Information About Your Visit        MyChart Information     [employer] is an electronic gateway that provides easy, online access to your medical records. With [employer], you can request a clinic appointment, read your test results, renew a prescription or communicate with your care team.     To sign up for [employer] visit the website at [email or web address]   You will be asked to enter the access code listed below, as well as some personal information. Please follow the directions to create your username and password.     Your access code is:  [email or web address]  Expires: 2018 10:38 AM     Your access code will  in 90 days. If you need help or a new code, please contact your [facility] or call [phone] for assistance.        Care EveryWhere ID     This is your Care EveryWhere ID. This could be used by other organizations to access your [place] medical records  [id number]        Your Vitals Were     Pulse                   71            Blood Pressure from Last 3 Encounters:   18 109/70   18 120/73   18 115/69    Weight from Last 3 Encounters:   18 56.6 kg (124 lb 12.8 oz)   18 58.2 kg (128 lb 3.2 oz)   18 59 kg (130 lb)              Today, you had the following     No orders found for display         Where to get your medicines      These medications were sent to [place] [address] [place] [address] [address]     Phone:  [phone]     risperiDONE 0.25 MG tablet    sertraline 100 MG tablet          Primary Care Provider Office Phone # Fax #    Stepan ONEIL MD Chris, [phone]       [address] [address]        Equal Access to Services     GIULIA DOTY AH: Hadii leda ku hadasho Soomaali, waaxda luqadaha, qaybta kaalmada adeegyada, waxay idiin haykarenn marianna sanchez. So [facility] [phone].    ATENCIÓN: Si habla español, tiene a marin disposición servicios gratuitos de asistencia lingüística. ame al [phone].    We comply with applicable federal civil rights laws and [place] laws. We do not discriminate on the basis of race, color, national origin, age, disability, sex, sexual orientation, or gender identity.            Thank you!     Thank you for choosing PSYCHIATRY CLINIC  for your care. Our goal is always to provide you with excellent care. Hearing back from our patients is one way we can continue to improve our services. Please take a few minutes to  complete the written survey that you may receive in the mail after your visit with us. Thank you!             Your Updated Medication List - Protect others around you: Learn how to safely use, store and throw away your medicines at www.disposemymeds.org.          This list is accurate as of 18 10:38 AM.  Always use your most recent med list.                   Brand Name Dispense Instructions for use Diagnosis    norethindrone 0.35 MG per tablet    MICRONOR     Take 1 tablet by mouth daily        risperiDONE 0.25 MG tablet    risperDAL    60 tablet    Take 1 tablet (0.25 mg) by mouth 2 times daily    Obsessive-compulsive disorder, unspecified type       sertraline 100 MG tablet    ZOLOFT    60 tablet    Take 2 tablets (200 mg) by mouth daily    Obsessive-compulsive disorder, unspecified type       UNABLE TO FIND      MEDICATION NAME: Pre  vitamins

## 2018-05-08 NOTE — PROGRESS NOTES
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 Aimee Mariee of [facility] for evaluation of depression and OCD.      Partner/ Support: Nando ()  Children: Lamin (born 2016) and Valerie (born 2017)  Therapist: Milagros at [facility]. Previously [facility]/Dr. Braga for CBT (sees weekly) and Sridhar Duarte of [facility] for couples therapy Q every other week (Auth to discuss on file)   PCP: Stepan Perez MD  OB-GYN: DILEEP Gregory CNM of [facility]    Pertinent Background:  OCD predating pregnancies (although only recently dx), PPD after the birth of her first child, disordered eating and intense fear of gaining weight.  Please see DA completed by Dyan Reyes PsyD, LP dated 18 for additional details of the HPI.  Psych critical item history includes suicidal ideation x1 2018, fleeting and without intent.     Interim History                                                                                                             4, 4     The patient is a good historian and reports good treatment adherence.  She was last seen in clinic on 3/13/18 at which time a session with her mother present was held and no medication changes were made. She is here with Valerie today.     Since the last visit:  - \"Still doing a lot better. The CBT has helped so much.\"   - Mood and sleep are good, anxiety much improved. Is able to recognize any obsessions (cleanliness and rules around food) as just thoughts, will skills to let them pass by and not disturb her functioning. They are much less frequent. None involving harm to self or children.   - Started individual therapy and work with dietician at the [facility]" "Program which she is finding very helpful. Eating 3 meals/day + snacks which is hard, but \"I know I need to do it, and I want to.\"  - Will start ABC therapy for Will at [facility] in June.    - She denies SI since her fleeting episode 1/19. Denies SIB.  - BF is going well, an abundance of milk supply. Valerie sleeping mostly through the night.  - Has set appropriate boundaries with her mother and father.  - Will pursue another couples therapy referral given Sridhar has retired.    Medications:  - Would like to maintain her Risperdal at current dose and continue her Zoloft today without change, has noted benefit with both.  - Tolerating both well with possible SE of dec libido, although she notes this is likely multifactorial and that it has predated her use of medications and feels it has do with some partner relational strain for other reasons.     Recent Symptoms:   Depression:  occasional, mild depressed mood,, anhedonia, low energy and poor concentration /memory  Elevated:  none  Psychosis:  none  Anxiety:  obsessions [improved in frequency and intensity as above, still some around eating] and compulsions [rules around food]  Panic Attack:  none  Eating Disorder: No current restricting/purging/binging although food-related obsessions and fear of weight gain.     Recent Substance Use:  very rare use of alcohol (1 beer every 3 months)    Reproductive Plans: on the mini-pill. Not thinking about having more children at this time.        Social/ Family History                                  [per patient report]                                              1,1   FINANCIAL SUPPORT- family or friend       CHILDREN- as above       LIVING SITUATION- with  Nando and two children in [place]      EARLY HISTORY/ EDUCATION- She has one younger sister and an older brother and her parents are  and live in the [facility]. Growing up she described her dad as  really strict  and said that she was the  perfect child.  "   TRAUMA HISTORY (self-report)- None  FEELS SAFE AT HOME- Yes  FAMILY HISTORY-  Corie s mother has a history of anxiety and untreated alcoholism. She thinks her father takes medication for a mental health concern, though she does not know what he is being treated for. Her sister has a history of depression and she participated in a hospital day treatment program for depression.     Psychiatric Medication Trials       Drug /  Start Date Dose (mg) Helpful Adverse Effects   DC Reason / Date   Zoloft/~Nov 2017 @ 38 wks pregnant 150 yes none current   Risperidone/~end of Dec 2017 0.25-0.5 BID yes Lightheadedness/fatigue; worsened obsessions at 0.5 mg BID dose current      Medical / Surgical History                                                                                                                   *Taking Risperdal 0.25 mg BID at 5AM and 3 PM  Patient Active Problem List   Diagnosis     Cervical high risk HPV (human papillomavirus) test positive     Depression complicating pregnancy, antepartum     OCD (obsessive compulsive disorder)     Raynaud's disease       No past surgical history on file.     PREGNANCY/ OBGYN HISTORY:  Date Event # weeks Medical Complications Psychiatric Complications Psychotropic Meds or other Fetal Exposures   1/12/16 Birth of Lamin Kimble in NICU PPD none   1/2017 SAB 6   none   12/3/17 Birth of Valerei 38.6 none PPD and OCD exacerbation none   Per 1/16/18 DA by Dr. Dyan Reyes (confirmed with pt):  Corie reported a notable difference between her two pregnancy and birth experiences. She said she gained 50 lbs. with her son, and 20 lbs. with her daughter. She reported a negative experience with medical professionals and during the birth process with her son. She gave birth to him at [facility] and due to distress he was taken to the NICU for several hours without Corie being able to hold him. She had to ask if she could see him and ask what his gender was. She  reported this impacted her ability to connect with him, which was later addressed in [facility] when she was seeking treatment around the birth of her daughter. She also reported a very difficult recovery from her son s birth and that she experienced exceptional pain and difficulty walking. She reported a much more positive experience during the pregnancy and birth process with her daughter. She worked with a midwife and gave birth at [facility] noting the birth was a  wonderful experience.     Medical Review of Systems                                                                                                           2,10   A comprehensive review of systems was performed and is negative other than noted in the HPI.  Pregnant- No    Breastfeeding- Yes    Contraception- Yes, mini-pill  Allergy                                Erythromycin and Penicillins  Current Medications                                                                                                         Current Outpatient Prescriptions   Medication Sig Dispense Refill     norethindrone (MICRONOR) 0.35 MG per tablet Take 1 tablet by mouth daily       risperiDONE (RISPERDAL) 0.25 MG tablet Take 1 tablet (0.25 mg) by mouth 2 times daily 60 tablet 0     sertraline (ZOLOFT) 100 MG tablet Take 2 tablets (200 mg) by mouth daily 60 tablet 2     UNABLE TO FIND MEDICATION NAME: Pre zurdo vitamins       Vitals                                                                                             /70  Pulse 71  Wt 56.6 kg (124 lb 12.8 oz)   Wt Readings from Last 4 Encounters:   18 56.6 kg (124 lb 12.8 oz)   18 58.2 kg (128 lb 3.2 oz)   18 59 kg (130 lb)   18 59.1 kg (130 lb 6.4 oz)     Mental Status Exam                                                                                          9, 14 cog gs   Alertness: alert  and oriented  Appearance: well groomed, appears much less  fatigued  Behavior/Demeanor: cooperative, pleasant and calm, with good  eye contact. Interacts appropriately with baby appropriately. Baby displays healthy attachment behavior.  Speech: normal and regular rate and rhythm, quiet  Language: intact  Psychomotor: normal or unremarkable  Mood: description consistent with euthymia  Affect: full range, was congruent to mood; was congruent to content  Thought Process/Associations: unremarkable  Thought Content:  Reports improvement in obsessions as noted above;  Denies suicidal and violent ideation and delusions  Perception:  Reports none;  Denies none  Insight: good  Judgment: excellent  Cognition: (6) does  appear grossly intact; formal cognitive testing was not done  Gait and Station: unremarkable    Labs and Data                                                                                                                   Rating Scales:  As completed in [employer]    PHQ9 Today:  6  No flowsheet data found.    No lab results found.  No lab results found.    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     Today, Zoraida describes a sustained improvement in anxiety, frequency and intensity of obsessional thoughts, and depression since medication initiations/adjustments, couples therapy, setting boundaries with family, and completion CBT. No SI since  and no evidence of risk of imminent harm (see risk statement below). Risperdal will be continued without  change, will not increase in the future at risk of worsening obsessional thoughts (with case reports in the literature of Risperdal's potential to exacerbate obsessions) and would maximize Zoloft first if necessary. Zoloft will be continued without change.     She is actively engaged in tx for ED at [facility], will continue to monitor weight. Although weight loss is common for BF mothers in the PP period, previous restricting behaviors were likely contributory.     Her strengths include: warm demeanor, motivation, dedication, willingness to utilize supports, intelligence.     We discussed upcoming transition of care to long-term psychiatrist given stability and improvement as well as this writer's upcoming absence upon graduation. She is always welcome back to the [facility] if a new psychiatric reproductive need should recur.     The r/b/a of Zoloft and risperidone in lactation have been discussed/reviewed with Corie Molina today and previously with Dr. Mariee. We also reviewed the risks of untreated  mood and anxiety disorders to both mother and infant. We reviewed SE of Zoloft and risperidone as well as general signs/symptoms in her breastfeeding child to monitor for (lethargy, decreased suck, change in behavior). We discussed that risperidone may be contributing to increased milk supply. She understands she is to continue arrange assistance with childcare while taking medications that may cause sedation. Corie Molina has had her questions answered, express her understanding of the information provided, and appears to have the capacity to give informed consent regarding treatment options.    SUICIDE RISK ASSESSMENT [details described above]:  Today Corie CLARICE Molina denies SI/SIB, although she experienced one, fleeting thought of SI with plan to jump from parking ramp, without intent on , none since that time.  She has notable risk factors for self-harm including severe anxiety and  relationship conflict.  However, risk is mitigated by no h/o suicide attempt, no h/o risky impulsive behavior, no access to lethal means, describes a safety plan, h/o seeking help when needed, symptom improvement, future oriented, feeling hopeful, commitment to family, good social support   and stable housing.  Based on all available evidence she does not appear to be at imminent risk for self-harm therefore does not meet criteria for a 72-hr hold/  involuntary hospitalization.  Additional steps to minimize risk include: frequent clinic visits, anxiety/ insomnia mngmt, limit med supply and expand care team.  Safety Plan placed in AVS: No: but reviewed verbally today.    [place] Prescription Monitoring Program [] was not checked today:  not using controlled substances.   Drug Interactions:  RISPERIDONE and SERTRALINE may result in increased risperidone exposure and risk of QT interval prolongation. Patient is aware of risks. Will consider role of EKG PRN, Risperdal previously reduced.      Plan                                                                                                                             +,++   1) MEDICATIONS:  - Continue Zoloft 200 mg daily   - Continue Risperdal 0.25 mg twice daily      2) THERAPY: Continue at [facility]    3) RTC: as needed with Dr. Goncalves. Please call Danette Ramírez RN @ [phone] with any questions or concerns.     4) OTHER:  -Patient was encouraged to bring her partner/support person to future appts  -[employer] is a good source for information on medications in breastfeeding  -www.womensmentalhealth.org is a good resource for information about psychiatric medication in pregnancy/lactation  - Dr. Goncalves will call you with psychiatry referrals for long-term care    5) CRISIS NUMBERS:   Provided routinely in AVS.  Pregnancy & Post[place] Support [place] (PPSM) Helpline: [phone] (Call or Text)       Treatment Risk Statement:  The patient understands the risks,  benefits, adverse effects and alternatives.  No medical contraindications and risks of using street drugs or alcohol is understood. Agrees to call clinic for any problems. The patient understands to call 911 or go to the nearest ED if life threatening or urgent symptoms present. Psychotropic drug interaction check was done, including changes made today.     WHODAS 2.0  TODAY total score = N/A; [a 12-item WHODAS 2.0 assessment was not completed by the pt today and/or recorded in EPIC].     PROVIDER:  Alexus Goncalves MD    Patient was not staffed in clinic. Note will be reviewed and signed by my supervisor, Dr. Martel.   I did not see this patient directly. I have reviewed the documentation and I agree with the resident's plan of care.     Ceci Martel MD

## 2018-05-08 NOTE — PATIENT INSTRUCTIONS
1) MEDICATIONS:  - Continue Zoloft 200 mg daily   - Continue Risperdal 0.25 mg twice daily      2) THERAPY: Continue     3) RTC: as needed with Dr. Goncalves. Please call Danette Ramírez RN @ [phone] with any questions or concerns.     4) OTHER:  -Patient was encouraged to bring her partner/support person to future appts  -[employer] is a good source for information on medications in breastfeeding  -www.womensmentalhealth.org is a good resource for information about psychiatric medication in pregnancy/lactation  - Dr. Goncalves will call you with psychiatry referrals for long-term care    5) CRISIS NUMBERS:   Provided routinely in S.  Pregnancy & Post[place] Support [place] (PPSM) Helpline: [phone] (Call or Text)

## 2018-05-08 NOTE — NURSING NOTE
Chief Complaint   Patient presents with     RECHECK     Obsessive-compulsive disorder, unspecified type

## 2018-05-15 ENCOUNTER — TELEPHONE (OUTPATIENT)
Dept: PSYCHIATRY | Facility: CLINIC | Age: 28
End: 2018-05-15

## 2018-05-15 NOTE — TELEPHONE ENCOUNTER
"Called and left VM requesting Corie to call back re: psychiatry referrals for long-term management as discussed at our last visit. If she has an e-mail, we can send the list that way too (her MyChart is pending).    Would recommend the following  psychiatry providers:  Kimberly Jimenze MD (I'm not sure if she is still accepting patients or not. [place] checking into).   [address] [place] [address]   [phone]   Private practice, self-pay only     GABRIELA Almendarez, DO   [facility]   [email or web address]   [address] [facility], [address], [place], [place], [address], [phone]   Accepts most insurance plans, including Medical Assistance     Eileen Scales MD   [facility]   [email or web address]   [place], [place], [phone]   Accepts most insurance plans, including Medical Assistance, however patients must have a [facility] Primary Care Physician to be   scheduled.     Miriam Navarrete MD   [place], [place]   [email or web address]     ---------------------------------------------------------------------------------------------------------   Although Dr. Schumacher is not marketed as a \"specialist\" in the  period, he is very thoughtful and capable of managing meds in the postpartum period.     Chan Schumacher MD   [facility]   [phone]     Would request Corie keep us updated if/when she is able to make an appt. We will continue to bridge care and be available to her to ensure this transition of care is as seamless as possible.     Alexus Goncalves MD  Psychiatry Resident, PGY-4      "

## 2018-05-17 ASSESSMENT — PATIENT HEALTH QUESTIONNAIRE - PHQ9: SUM OF ALL RESPONSES TO PHQ QUESTIONS 1-9: 6

## 2018-05-30 ENCOUNTER — TELEPHONE (OUTPATIENT)
Dept: PSYCHIATRY | Facility: CLINIC | Age: 28
End: 2018-05-30

## 2018-05-30 NOTE — TELEPHONE ENCOUNTER
Reached Corie today on the phone and relayed psychiatry referrals as outlined in my 5/15/18 note. She was receptive to this information and will call if she has any trouble in making an appt.    She was reminded we will bridge her care until she establishes care longitudinally in the community.    She states she was doing well overall, denied any safety concerns.    Alexus Goncalves MD  Psychiatry Resident, PGY-4

## 2018-06-18 DIAGNOSIS — Z79.899 ENCOUNTER FOR LONG-TERM (CURRENT) USE OF MEDICATIONS: ICD-10-CM

## 2018-06-18 LAB
ALBUMIN SERPL-MCNC: 4.4 G/DL (ref 3.4–5)
ALP SERPL-CCNC: 71 U/L (ref 40–150)
ALT SERPL W P-5'-P-CCNC: 25 U/L (ref 0–50)
ANION GAP SERPL CALCULATED.3IONS-SCNC: 5 MMOL/L (ref 3–14)
AST SERPL W P-5'-P-CCNC: 14 U/L (ref 0–45)
BASOPHILS # BLD AUTO: 0 10E9/L (ref 0–0.2)
BASOPHILS NFR BLD AUTO: 0.8 %
BILIRUB SERPL-MCNC: 0.4 MG/DL (ref 0.2–1.3)
BUN SERPL-MCNC: 18 MG/DL (ref 7–30)
CALCIUM SERPL-MCNC: 9.6 MG/DL (ref 8.5–10.1)
CHLORIDE SERPL-SCNC: 104 MMOL/L (ref 94–109)
CHOLEST SERPL-MCNC: 174 MG/DL
CO2 SERPL-SCNC: 30 MMOL/L (ref 20–32)
CREAT SERPL-MCNC: 0.69 MG/DL (ref 0.52–1.04)
DIFFERENTIAL METHOD BLD: NORMAL
EOSINOPHIL # BLD AUTO: 0.1 10E9/L (ref 0–0.7)
EOSINOPHIL NFR BLD AUTO: 1.1 %
ERYTHROCYTE [DISTWIDTH] IN BLOOD BY AUTOMATED COUNT: 11 % (ref 10–15)
GFR SERPL CREATININE-BSD FRML MDRD: >90 ML/MIN/1.7M2
GLUCOSE SERPL-MCNC: 83 MG/DL (ref 70–99)
HBA1C MFR BLD: 5.4 % (ref 0–5.6)
HCT VFR BLD AUTO: 38 % (ref 35–47)
HDLC SERPL-MCNC: 91 MG/DL
HGB BLD-MCNC: 12.6 G/DL (ref 11.7–15.7)
LDLC SERPL CALC-MCNC: 77 MG/DL
LYMPHOCYTES # BLD AUTO: 1.9 10E9/L (ref 0.8–5.3)
LYMPHOCYTES NFR BLD AUTO: 36.5 %
MCH RBC QN AUTO: 31.3 PG (ref 26.5–33)
MCHC RBC AUTO-ENTMCNC: 33.2 G/DL (ref 31.5–36.5)
MCV RBC AUTO: 94 FL (ref 78–100)
MONOCYTES # BLD AUTO: 0.4 10E9/L (ref 0–1.3)
MONOCYTES NFR BLD AUTO: 7.4 %
NEUTROPHILS # BLD AUTO: 2.9 10E9/L (ref 1.6–8.3)
NEUTROPHILS NFR BLD AUTO: 54.2 %
NONHDLC SERPL-MCNC: 83 MG/DL
PLATELET # BLD AUTO: 227 10E9/L (ref 150–450)
POTASSIUM SERPL-SCNC: 3.9 MMOL/L (ref 3.4–5.3)
PROT SERPL-MCNC: 7.8 G/DL (ref 6.8–8.8)
RBC # BLD AUTO: 4.03 10E12/L (ref 3.8–5.2)
SODIUM SERPL-SCNC: 139 MMOL/L (ref 133–144)
TRIGL SERPL-MCNC: 31 MG/DL
WBC # BLD AUTO: 5.3 10E9/L (ref 4–11)

## 2018-06-18 PROCEDURE — 83036 HEMOGLOBIN GLYCOSYLATED A1C: CPT | Performed by: PSYCHIATRY & NEUROLOGY

## 2018-06-18 PROCEDURE — 80053 COMPREHEN METABOLIC PANEL: CPT | Performed by: PSYCHIATRY & NEUROLOGY

## 2018-06-18 PROCEDURE — 80061 LIPID PANEL: CPT | Performed by: PSYCHIATRY & NEUROLOGY

## 2018-06-18 PROCEDURE — 36415 COLL VENOUS BLD VENIPUNCTURE: CPT | Performed by: PSYCHIATRY & NEUROLOGY

## 2018-06-18 PROCEDURE — 85025 COMPLETE CBC W/AUTO DIFF WBC: CPT | Performed by: PSYCHIATRY & NEUROLOGY

## 2020-05-06 NOTE — PROGRESS NOTES
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 Aimee Mariee of [facility] for evaluation of depression and OCD.      Partner/ Support: Nando ()  Children: Lamin (born 2016) and Valerie (born 2017)  Therapist: Honey Shearer/Dr. Braga for CBT (sees weekly) and Sridhar Duarte of [facility] for couples therapy Q every other week (Auth to discuss on file)   PCP: Stepan Perez MD  OB-GYN: DILEEP Gregory CNM of [facility]    Pertinent Background:  OCD predating pregnancies (although only recently dx), PPD after the birth of her first child, disordered eating and intense fear of gaining weight.  Please see DA completed by Dyan Reyes PsyD, LP dated 18 for additional details of the HPI.  Psych critical item history includes suicidal ideation x1, fleeting and without intent.     Interim History                                                                                                             4, 4     The patient is a good historian and reports good treatment adherence.  She was last seen in clinic on 18 at which time Zoloft was increased to 200mg. She is accompanied by her infant, Valerie, today.    Since the last visit:  - \"It's been a really hard week in CBT.\" Notes that her homework to challenge her food \"rules\" and pairings has been particularly difficult.   - Has noticed she had been avoiding eating anything last week so she would not have to engage in the exposure therapy. Did, however, engage in her homework this weekend when she allowed Nando to pick/prepare meals and ate macaroni and cheese as well as tortellini for lunch (which break her pairing rule as well as having those foods at " "lunchtime). She ate this meal but then felt anxious and overwhelmed immediately after leading her to \"binge eat\" 1/4 bag of chocolate chips. No purging after. She does not feel guilty about this and states, \"I actually really haven't thought much about it.\" Was also able to go grocery shopping on Sat in a way that was in accordance with CBT recs.  - Had a couples therapy app[place] at [facility] on Friday which was \"okay.\"   - Notes a particularly stressful day on Saturday as she engaged in her CBT HW as above and was also alone with her two kids for most of the morning while Nando was in an ice[employer] tournament. \"I don't think I was ready to do that all alone, but I felt I needed to try.\" After eating the chocolate chips, Zoraida called Nando and requested he come home to which he agreed. After picking him up, however, they got into a verbal argument as it sounds Nando was unsure of how to be most helpful. At that time, Zoraida made a remark that he \"makes me think of just driving into the ditch.\" Zoraida states she had no intent or plan to do so and that \"this was totally out of frustration, I didn't mean it and regretted it immediately.\" She and Nando have since discussed ways to enhance their communication and Zoraida acknowledges that it would be helpful to reach out for help prior to things feeling so intense.  - She denies SI since her fleeting episode 1/19. Denies SIB.  - Ego-dystonic obsessions continue to improve in frequency (not every day) and intensity (not involving thoughts of her children) since reduction in Risperdal and CBT. When they occur, obsession include cleanliness and order.   - BF is going well.  - Has joined the [employer] and started to swim again, not excessively.    Medications:  - Would like to maintain her Risperdal at current dose and increase her Zoloft today without change. Does wonder if her stressful day on Saturday \"means the Zoloft isn't working?\"  - Fatigue has almost resolved with Risperidone " reduction.  - Tolerating both well with possible SE of dec libido, although she notes this is likely multifactorial and that it has predated her use of medications.     Recent Symptoms:   Depression:  improved but remaining sx of, depressed mood, low energy, excessive guilt and overwhelmed  Elevated:  none  Psychosis:  none  Anxiety:  social anxiety, obsessions [improved in frequency and intensity as above], compulsions [abstaining from recipie planning] and nervous/overwhelmed  Panic Attack:  none  Eating Disorder: Binge on chocolate chips as noted above. No current restricting/purging although food-related obsessions and fear of weight gain as above     Recent Substance Use:  very rare use of alcohol (1 beer every 3 months)    Reproductive Plans: on the mini-pill. Not thinking about having more children at this time.        Social/ Family History                                  [per patient report]                                              1,1   FINANCIAL SUPPORT- family or friend       CHILDREN- as above       LIVING SITUATION- with  Nando and two children in [place]      EARLY HISTORY/ EDUCATION- She has one younger sister and an older brother and her parents are  and live in the [facility]. Growing up she described her dad as  really strict  and said that she was the  perfect child.    TRAUMA HISTORY (self-report)- None  FEELS SAFE AT HOME- Yes  FAMILY HISTORY-  Corie s mother has a history of anxiety and untreated alcoholism. She thinks her father takes medication for a mental health concern, though she does not know what he is being treated for. Her sister has a history of depression and she participated in a hospital day treatment program for depression.     Psychiatric Medication Trials       Drug /  Start Date Dose (mg) Helpful Adverse Effects   DC Reason / Date   Zoloft/~Nov 2017 @ 38 wks pregnant 150 yes none current   Risperidone/~end of Dec 2017 0.25-0.5 BID yes  Lightheadedness/fatigue; worsened obsessions at 0.5 mg BID dose current      Medical / Surgical History                                                                                                                   *Taking Risperdal 0.25 mg BID at 5AM and 3 PM  Patient Active Problem List   Diagnosis     Cervical high risk HPV (human papillomavirus) test positive     Depression complicating pregnancy, antepartum     OCD (obsessive compulsive disorder)     Raynaud's disease       No past surgical history on file.     PREGNANCY/ OBGYN HISTORY:  Date Event # weeks Medical Complications Psychiatric Complications Psychotropic Meds or other Fetal Exposures   1/12/16 Birth of Lamin 39 Lamin in NICU PPD none   1/2017 SAB 6   none   12/3/17 Birth of Valerie 38.6 none PPD and OCD exacerbation none   Per 1/16/18 DA by Dr. Dyan Reyes (confirmed with pt):  Corie reported a notable difference between her two pregnancy and birth experiences. She said she gained 50 lbs. with her son, and 20 lbs. with her daughter. She reported a negative experience with medical professionals and during the birth process with her son. She gave birth to him at [facility] and due to distress he was taken to the NICU for several hours without Corie being able to hold him. She had to ask if she could see him and ask what his gender was. She reported this impacted her ability to connect with him, which was later addressed in [facility] when she was seeking treatment around the birth of her daughter. She also reported a very difficult recovery from her son s birth and that she experienced exceptional pain and difficulty walking. She reported a much more positive experience during the pregnancy and birth process with her daughter. She worked with a midwife and gave birth at [facility] noting the birth was a  wonderful experience.     Medical Review of Systems                                                                                                            2,10   A comprehensive review of systems was performed and is negative other than noted in the HPI.  Pregnant- No    Breastfeeding- Yes    Contraception- Yes, mini-pill  Allergy                                Erythromycin and Penicillins  Current Medications                                                                                                         Current Outpatient Prescriptions   Medication Sig Dispense Refill     UNABLE TO FIND MEDICATION NAME: Pre  vitamins       risperiDONE (RISPERDAL) 0.25 MG tablet Take 1 tablet (0.25 mg) by mouth 2 times daily 60 tablet 2     sertraline (ZOLOFT) 100 MG tablet Take 2 tablets (200 mg) by mouth daily 60 tablet 2     norethindrone (MICRONOR) 0.35 MG per tablet Take 1 tablet by mouth daily       Vitals                                                                                             There were no vitals taken for this visit.   Mental Status Exam                                                                                          9, 14 cog gs   Alertness: alert  and oriented  Appearance: well groomed, appears less fatigued  Behavior/Demeanor: cooperative, pleasant and calm, with good  eye contact. Interacts appropriately with Valerie and demonstrates distress tolerance when she is fussy.  Speech: normal and regular rate and rhythm, quiet  Language: intact  Psychomotor: normal or unremarkable  Mood: anxious  Affect: restricted, although does brighten and smile appropriately at times, was congruent to mood; was congruent to content  Thought Process/Associations: unremarkable  Thought Content:  Reports improvement in obsessions as noted above;  Denies suicidal and violent ideation and delusions  Perception:  Reports none;  Denies none  Insight: good  Judgment: excellent  Cognition: (6) does  appear grossly intact; formal cognitive testing was not done  Gait and Station: unremarkable    Labs and Data                                                                                                                    Rating Scales:  As completed in [employer]    PHQ9 Today:  Not completed today  No flowsheet data found.    No lab results found.  No lab results found.    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     Today, Zoraida describes a continued improvement in frequency and intensity of obsessional thoughts since reduction of Risperdal as well as starting CBT. Depression has also improved, no SI since  and no evidence of risk of imminent harm (see risk statement below). Risperdal will be continued without change, will not increase in the future at risk of worsening obsessional thoughts (with case reports in the literature of Risperdal's potential to exacerbate obsessions). Would anticipate maximizing Zoloft and engagement in CBT will allow for future reduction/taper of this adjuvant treatment. Zoloft will be continued without change given recent increase and noted improvement.     The evidence-based CBT she has recently started will likely further attenuate symptoms and it is not surprising that her OCD sx have increased somewhat since starting, as if often the case when engaging in exposure therapy. Obsessions around eating and eating behaviors will be continued to be closely monitored, especially in the context of recent diet restriction recommended by her pediatrician. Although there is  definitely overlap between her OCD sx and a potential ED, writer has high-suspicion for an unspecified ED and will consider CBT-E with Dr. Rudd if symptoms remain after her completion of [facility] CBT for OCD.    Her strengths include: warm demeanor, motivation, dedication, willingness to utilize supports, intelligence.     The r/b/a of Zoloft and risperidone in lactation were discussed/reviewed with Corie Molina today and at previous visit. Dr. Mariee has also previously discussed these r/b/a. We also reviewed the risks of untreated  mood and anxiety disorders to both mother and infant. We reviewed SE of Zoloft and risperidone as well as general signs/symptoms in her breastfeeding child to monitor for (lethargy, decreased suck, change in behavior). She understands she is to continue arrange assistance with childcare while taking medications that may cause sedation. Corie Molina has had her questions answered, express her understanding of the information provided, and appears to have the capacity to give informed consent regarding treatment options.    SUICIDE RISK ASSESSMENT [details described above]:  Today Corie Molina denies SI/SIB, although she experienced one, fleeting thought of SI with plan to jump from parking ramp, without intent on , none since that time.  She has notable risk factors for self-harm including severe anxiety and relationship conflict.  However, risk is mitigated by no h/o suicide attempt, no h/o risky impulsive behavior, no access to lethal means, describes a safety plan, h/o seeking help when needed, symptom improvement, future oriented, feeling hopeful, commitment to family, good social support   and stable housing.  Based on all available evidence she does not appear to be at imminent risk for self-harm therefore does not meet criteria for a 72-hr hold/  involuntary hospitalization.  Additional steps to minimize risk include: frequent clinic visits,  anxiety/ insomnia mngmt, limit med supply and expand care team, and nurse follow-up check-in.  Safety Plan placed in AVS: No: but reviewed verbally today.    [place] Prescription Monitoring Program [] was not checked today:  not using controlled substances.   Drug Interactions:  RISPERIDONE and SERTRALINE may result in increased risperidone exposure and risk of QT interval prolongation. Patient is aware of risks. Will consider role of EKG PRN, Risperdal recently reduced.      Plan                                                                                                                             +,++   1) MEDICATIONS:  - Continue Zoloft to 200 mg daily   - Continue Risperdal 0.25 mg twice daily      2) THERAPY: Continue couples and individual     3) RTC: in 4 weeks with Dr. Goncalves for 30 min [facility] in [facility] as already scheduled. Please call Danette Ramírez RN @ [phone] with any questions or concerns.     4) OTHER:  -Patient was encouraged to bring her partner/support person to future appts  -[employer] is a good source for information on medications in breastfeeding  -www.womensmentalhealth.org is a good resource for information about psychiatric medication in pregnancy/lactation    5) CRISIS NUMBERS:   Provided routinely in AVS.  Pregnancy & Postpartum Support [place] (PPSM) Helpline: [phone] (Call or Text)       Treatment Risk Statement:  The patient understands the risks, benefits, adverse effects and alternatives.  No medical contraindications and risks of using street drugs or alcohol is understood. Agrees to call clinic for any problems. The patient understands to call 911 or go to the nearest ED if life threatening or urgent symptoms present. Psychotropic drug interaction check was done, including changes made today.     WHODAS 2.0  TODAY total score = N/A; [a 12-item WHODAS 2.0 assessment was not completed by the pt today and/or recorded in EPIC].     PROVIDER:  Alexus Goncalves MD    I did not see  this patient directly. I have reviewed the documentation and I agree with the resident's plan of care.     Ceci Martel MD   Performed under sterile technique

## 2021-10-25 ENCOUNTER — HOSPITAL ENCOUNTER (EMERGENCY)
Facility: CLINIC | Age: 31
Discharge: HOME | End: 2021-10-25
Attending: PHYSICIAN ASSISTANT | Admitting: PHYSICIAN ASSISTANT
Payer: COMMERCIAL

## 2021-10-25 ENCOUNTER — APPOINTMENT (OUTPATIENT)
Dept: ULTRASOUND IMAGING | Facility: CLINIC | Age: 31
End: 2021-10-25
Attending: PHYSICIAN ASSISTANT
Payer: COMMERCIAL

## 2021-10-25 VITALS
WEIGHT: 150 LBS | OXYGEN SATURATION: 98 % | SYSTOLIC BLOOD PRESSURE: 133 MMHG | TEMPERATURE: 98.5 F | DIASTOLIC BLOOD PRESSURE: 87 MMHG | HEART RATE: 81 BPM

## 2021-10-25 DIAGNOSIS — M79.604 RIGHT LEG PAIN: ICD-10-CM

## 2021-10-25 DIAGNOSIS — M79.601 RIGHT ARM PAIN: ICD-10-CM

## 2021-10-25 LAB
ALBUMIN SERPL-MCNC: 2.5 G/DL (ref 3.4–5)
ALP SERPL-CCNC: 101 U/L (ref 40–150)
ALT SERPL W P-5'-P-CCNC: 30 U/L (ref 0–50)
ANION GAP SERPL CALCULATED.3IONS-SCNC: 6 MMOL/L (ref 3–14)
AST SERPL W P-5'-P-CCNC: 29 U/L (ref 0–45)
BASOPHILS # BLD AUTO: 0 10E3/UL (ref 0–0.2)
BASOPHILS NFR BLD AUTO: 1 %
BILIRUB SERPL-MCNC: 0.2 MG/DL (ref 0.2–1.3)
BUN SERPL-MCNC: 7 MG/DL (ref 7–30)
CALCIUM SERPL-MCNC: 8.8 MG/DL (ref 8.5–10.1)
CHLORIDE BLD-SCNC: 111 MMOL/L (ref 94–109)
CO2 SERPL-SCNC: 24 MMOL/L (ref 20–32)
CREAT SERPL-MCNC: 0.5 MG/DL (ref 0.52–1.04)
EOSINOPHIL # BLD AUTO: 0.1 10E3/UL (ref 0–0.7)
EOSINOPHIL NFR BLD AUTO: 1 %
ERYTHROCYTE [DISTWIDTH] IN BLOOD BY AUTOMATED COUNT: 12.3 % (ref 10–15)
GFR SERPL CREATININE-BSD FRML MDRD: >90 ML/MIN/1.73M2
GLUCOSE BLD-MCNC: 72 MG/DL (ref 70–99)
HCT VFR BLD AUTO: 34.9 % (ref 35–47)
HGB BLD-MCNC: 11.4 G/DL (ref 11.7–15.7)
IMM GRANULOCYTES # BLD: 0 10E3/UL
IMM GRANULOCYTES NFR BLD: 0 %
LYMPHOCYTES # BLD AUTO: 1.4 10E3/UL (ref 0.8–5.3)
LYMPHOCYTES NFR BLD AUTO: 19 %
MCH RBC QN AUTO: 31.7 PG (ref 26.5–33)
MCHC RBC AUTO-ENTMCNC: 32.7 G/DL (ref 31.5–36.5)
MCV RBC AUTO: 97 FL (ref 78–100)
MONOCYTES # BLD AUTO: 0.5 10E3/UL (ref 0–1.3)
MONOCYTES NFR BLD AUTO: 6 %
NEUTROPHILS # BLD AUTO: 5.6 10E3/UL (ref 1.6–8.3)
NEUTROPHILS NFR BLD AUTO: 73 %
NRBC # BLD AUTO: 0 10E3/UL
NRBC BLD AUTO-RTO: 0 /100
PLATELET # BLD AUTO: 188 10E3/UL (ref 150–450)
POTASSIUM BLD-SCNC: 3.6 MMOL/L (ref 3.4–5.3)
PROT SERPL-MCNC: 6.4 G/DL (ref 6.8–8.8)
RBC # BLD AUTO: 3.6 10E6/UL (ref 3.8–5.2)
SODIUM SERPL-SCNC: 141 MMOL/L (ref 133–144)
WBC # BLD AUTO: 7.6 10E3/UL (ref 4–11)

## 2021-10-25 PROCEDURE — 93971 EXTREMITY STUDY: CPT | Mod: RT,XS

## 2021-10-25 PROCEDURE — 84075 ASSAY ALKALINE PHOSPHATASE: CPT | Performed by: PHYSICIAN ASSISTANT

## 2021-10-25 PROCEDURE — 99285 EMERGENCY DEPT VISIT HI MDM: CPT | Mod: 25 | Performed by: PHYSICIAN ASSISTANT

## 2021-10-25 PROCEDURE — 93971 EXTREMITY STUDY: CPT | Mod: RT

## 2021-10-25 PROCEDURE — 85025 COMPLETE CBC W/AUTO DIFF WBC: CPT | Performed by: PHYSICIAN ASSISTANT

## 2021-10-25 PROCEDURE — 36415 COLL VENOUS BLD VENIPUNCTURE: CPT | Performed by: PHYSICIAN ASSISTANT

## 2021-10-25 PROCEDURE — 99282 EMERGENCY DEPT VISIT SF MDM: CPT | Performed by: PHYSICIAN ASSISTANT

## 2021-10-25 RX ORDER — VITAMIN A ACETATE, BETA CAROTENE, ASCORBIC ACID, CHOLECALCIFEROL, .ALPHA.-TOCOPHEROL ACETATE, DL-, THIAMINE MONONITRATE, RIBOFLAVIN, NIACINAMIDE, PYRIDOXINE HYDROCHLORIDE, FOLIC ACID, CYANOCOBALAMIN, CALCIUM CARBONATE, FERROUS FUMARATE, ZINC OXIDE, CUPRIC OXIDE 3080; 12; 120; 400; 1; 1.84; 3; 20; 22; 920; 25; 200; 27; 10; 2 [IU]/1; UG/1; MG/1; [IU]/1; MG/1; MG/1; MG/1; MG/1; MG/1; [IU]/1; MG/1; MG/1; MG/1; MG/1; MG/1
1 TABLET, FILM COATED ORAL DAILY
COMMUNITY

## 2021-10-25 RX ORDER — IBUPROFEN 200 MG
200 TABLET ORAL EVERY 4 HOURS PRN
COMMUNITY

## 2021-10-25 RX ORDER — ACETAMINOPHEN 325 MG/1
650 TABLET ORAL EVERY 6 HOURS PRN
COMMUNITY

## 2021-10-25 NOTE — ED TRIAGE NOTES
Pt c/o right arm and right leg swelling that started this AM. C/o that side being sore. Pt 2 days postpartum, vaginal delivery, uncomplicated. Denies chest pain or shortness of breath.

## 2021-10-25 NOTE — ED PROVIDER NOTES
History     Chief Complaint   Patient presents with     Leg Swelling     Arm Pain     HPI  Corie Molina is a 30 year old female who presents with complaints of right leg and arm pain since this morning.  Pt is 2 days postpartum uncomplicated vaginal delivery.  She states she has had varicose veins in her right leg throughout the pregnancy, but now she is experiencing right leg pain that extends from her buttocks region, into her right upper leg, and down into her right calf.  Pt states her right leg looks more swollen today as well as compared to the left.  She has had bilateral foot pain throughout the pregnancy.  Pt then noticed that her right arm (to the AC region) was painful today.  She had an IV placed here during the delivery.  Pt states the day after her delivery both of her upper arms were sore from the pushing but today the pain has localized to her right arm and right leg.  Pt denies hx DVT/PE.  She denies chest pain or shortness of breath.  No headaches, vision changes, or difficulties urinating.  Pt had an uncomplicated pregnancy and delivery.  No pre-eclampsia.  This is patient's fourth child.  Denies fevers, chills, cough, vomiting, or abdominal pain.  No redness to the arm or leg.      Allergies:  Allergies   Allergen Reactions     Erythromycin Nausea and Vomiting     Pcn [Penicillins] Rash       Problem List:    Patient Active Problem List    Diagnosis Date Noted     OCD (obsessive compulsive disorder) 11/29/2017     Priority: Medium     Overview:   Started day treatment at [facility] [place] at 38 weeks.  Team believes has OCD/anxiety/Depression.  Started Zoloft. Program very helpful.        Depression complicating pregnancy, antepartum 11/16/2017     Priority: Medium     Overview:   See therapist weekly, symptoms worsening at 36 weeks, declines medication, plans intake at [facility] for mother/baby program. Her therapist recommends more intense treatment.        Cervical high risk HPV (human papillomavirus)  test positive 03/13/2016     Priority: Medium     Overview:   Pap: NIL, Colposcopy advised       Raynaud's disease 06/03/2014     Priority: Medium     Overview:   ICD 10          Past Medical History:    No past medical history on file.    Past Surgical History:    No past surgical history on file.    Family History:    Family History   Problem Relation Age of Onset     Diabetes Maternal Uncle      Cerebrovascular Disease Maternal Grandmother      Diabetes Maternal Grandfather      Cancer No family hx of      Hypertension No family hx of      Thyroid Disease No family hx of      Glaucoma No family hx of      Macular Degeneration No family hx of        Social History:  Marital Status:   [2]  Social History     Tobacco Use     Smoking status: Never Smoker     Smokeless tobacco: Never Used     Tobacco comment: Lives in smoke free househole   Substance Use Topics     Alcohol use: Not on file     Drug use: Not on file        Medications:    acetaminophen (TYLENOL) 325 MG tablet  ibuprofen (ADVIL/MOTRIN) 200 MG tablet  Prenatal Vit-Fe Fumarate-FA (PRENATAL PLUS) 27-1 MG TABS  sertraline (ZOLOFT) 100 MG tablet          Review of Systems   Constitutional: Negative for fever.   Respiratory: Negative.    Cardiovascular: Negative.    Musculoskeletal:        Right leg and arm pain   Skin: Negative.    Neurological: Negative.    All other systems reviewed and are negative.      Physical Exam   BP: 133/87  Pulse: 81  Temp: 98.5  F (36.9  C)  Weight: 68 kg (150 lb)  SpO2: 98 %      Physical Exam  Constitutional:       General: She is not in acute distress.     Appearance: Normal appearance. She is not ill-appearing, toxic-appearing or diaphoretic.   HENT:      Head: Normocephalic and atraumatic.      Nose: Nose normal.      Mouth/Throat:      Lips: Pink.      Mouth: Mucous membranes are moist.   Eyes:      Conjunctiva/sclera: Conjunctivae normal.   Cardiovascular:      Rate and Rhythm: Normal rate and regular rhythm.       Pulses: Normal pulses.      Heart sounds: Normal heart sounds.   Pulmonary:      Effort: Pulmonary effort is normal.      Breath sounds: Normal breath sounds.   Musculoskeletal:      Right forearm: Tenderness present. No swelling, edema or deformity.      Cervical back: Normal range of motion and neck supple.      Right lower leg: Tenderness present. No swelling, deformity, lacerations or bony tenderness. No edema.      Right ankle: Normal.      Left ankle: Normal.      Right foot: Swelling present.      Left foot: Swelling present.      Comments: Mild tenderness to AC region of right arm.  No associated swelling, erythema, or palpable cord.  Mild tenderness to right posterior upper and lower legs.  No edema or erythema.  Varicose veins to right lower leg.  Mild pedal swelling bilaterally   Skin:     General: Skin is warm and dry.      Capillary Refill: Capillary refill takes less than 2 seconds.   Neurological:      General: No focal deficit present.      Mental Status: She is alert.      Sensory: Sensation is intact.      Motor: Motor function is intact.   Psychiatric:         Behavior: Behavior is cooperative.         ED Course        Procedures    No results found for this or any previous visit (from the past 24 hour(s)).     Results for orders placed or performed during the hospital encounter of 10/25/21   US Lower Extremity Venous Duplex Right     Status: None    Narrative    VENOUS ULTRASOUND RIGHT LEG  10/25/2021 3:52 PM     HISTORY: right leg pain and swelling, 2 days post-partum    COMPARISON: None.    FINDINGS:  Examination of the deep veins with graded compression and  color flow Doppler with spectral wave form analysis was performed.   There is no evidence for DVT in the right lower extremity.      Impression    IMPRESSION: No evidence of deep venous thrombosis.    DG WILLSON MD         SYSTEM ID:  [id number]   US Upper Extremity Venous Duplex Right     Status: None    Narrative    US UPPER  EXTREMITY VENOUS DUPLEX RIGHT  10/25/2021 3:25 PM     HISTORY: right arm pain to AC region, 2 days post-partum, prior IV  placement to area    COMPARISON: None.    TECHNIQUE: Examination of the upper extremity veins was performed with  graded compression and 2-D ultrasound and color doppler spectral  waveform analysis.     FINDINGS:  There is no evidence of thrombosis of the axillary,  brachial, basilic or cephalic veins.  The veins in the forearm show no  evidence of thrombosis.      Impression    IMPRESSION: No evidence for DVT in the right upper extremity.    DG WILLSON MD         SYSTEM ID:  [id number]   Comprehensive metabolic panel     Status: Abnormal   Result Value Ref Range    Sodium 141 133 - 144 mmol/L    Potassium 3.6 3.4 - 5.3 mmol/L    Chloride 111 (H) 94 - 109 mmol/L    Carbon Dioxide (CO2) 24 20 - 32 mmol/L    Anion Gap 6 3 - 14 mmol/L    Urea Nitrogen 7 7 - 30 mg/dL    Creatinine 0.50 (L) 0.52 - 1.04 mg/dL    Calcium 8.8 8.5 - 10.1 mg/dL    Glucose 72 70 - 99 mg/dL    Alkaline Phosphatase 101 40 - 150 U/L    AST 29 0 - 45 U/L    ALT 30 0 - 50 U/L    Protein Total 6.4 (L) 6.8 - 8.8 g/dL    Albumin 2.5 (L) 3.4 - 5.0 g/dL    Bilirubin Total 0.2 0.2 - 1.3 mg/dL    GFR Estimate >90 >60 mL/min/1.73m2   CBC with platelets and differential     Status: Abnormal   Result Value Ref Range    WBC Count 7.6 4.0 - 11.0 10e3/uL    RBC Count 3.60 (L) 3.80 - 5.20 10e6/uL    Hemoglobin 11.4 (L) 11.7 - 15.7 g/dL    Hematocrit 34.9 (L) 35.0 - 47.0 %    MCV 97 78 - 100 fL    MCH 31.7 26.5 - 33.0 pg    MCHC 32.7 31.5 - 36.5 g/dL    RDW 12.3 10.0 - 15.0 %    Platelet Count 188 150 - 450 10e3/uL    % Neutrophils 73 %    % Lymphocytes 19 %    % Monocytes 6 %    % Eosinophils 1 %    % Basophils 1 %    % Immature Granulocytes 0 %    NRBCs per 100 WBC 0 <1 /100    Absolute Neutrophils 5.6 1.6 - 8.3 10e3/uL    Absolute Lymphocytes 1.4 0.8 - 5.3 10e3/uL    Absolute Monocytes 0.5 0.0 - 1.3 10e3/uL    Absolute Eosinophils 0.1  0.0 - 0.7 10e3/uL    Absolute Basophils 0.0 0.0 - 0.2 10e3/uL    Absolute Immature Granulocytes 0.0 <=0.0 10e3/uL    Absolute NRBCs 0.0 10e3/uL   CBC with platelets differential     Status: Abnormal    Narrative    The following orders were created for panel order CBC with platelets differential.  Procedure                               Abnormality         Status                     ---------                               -----------         ------                     CBC with platelets and d...[[id number]]  Abnormal            Final result                 Please view results for these tests on the individual orders.       Medications - No data to display    Assessments & Plan (with Medical Decision Making)     Pt is a 30 year old female who presents with complaints of right leg and arm pain since this morning.  Pt is 2 days postpartum uncomplicated vaginal delivery.  She states she has had varicose veins in her right leg throughout the pregnancy, but now she is experiencing right leg pain that extends from her buttocks region, into her right upper leg, and down into her right calf.  Pt states her right leg looks more swollen today as well as compared to the left.  Pt then noticed that her right arm (to the AC region) was painful today.  She had an IV placed here during the delivery.  No hx DVT/PE.  Pt had an uncomplicated pregnancy and delivery.  No pre-eclampsia.  No skin changes.    Pt is afebrile on arrival.  Normotensive at 133/87.  Exam as above.  Venous ultrasounds of right upper and lower extremities were negative for DVT.  No leukocytosis on CBC.  Hemoglobin is stable.  Creatinine and liver function tests are not elevated.  Discussed results with patient.  No evidence of infection on exam.  No pre-eclampsia.  Encouraged symptomatic treatments at home.  Return precautions were reviewed.  Hand-outs were provided.    Patient was instructed to follow-up with OB/GYN for continued care and management or sooner if  new or worsening symptoms.  She is to return to the ED for persistent and/or worsening symptoms.  Patient expressed understanding of the diagnosis and plan and was discharged home in good condition.    I have reviewed the nursing notes.    I have reviewed the findings, diagnosis, plan and need for follow up with the patient.    Discharge Medication List as of 10/25/2021  4:13 PM          Final diagnoses:   Right arm pain   Right leg pain       10/25/2021   [facility] EMERGENCY DEPT      Disclaimer:  This note consists of symbols derived from keyboarding, dictation and/or voice recognition software.  As a result, there may be errors in the script that have gone undetected.  Please consider this when interpreting information found in this chart.     Mary Jane Burrell PA-C  10/26/21 [id number]

## 2021-10-25 NOTE — DISCHARGE INSTRUCTIONS
Your ultrasound of the right arm and right leg were negative for blood clot.      You may elevate your legs and take Tylenol or Ibuprofen as needed for pain.    Return to the emergency department should you develop persistent or worsening pain, swelling, or any other symptoms of concern.

## 2021-10-26 ASSESSMENT — ENCOUNTER SYMPTOMS
RESPIRATORY NEGATIVE: 1
NEUROLOGICAL NEGATIVE: 1
FEVER: 0
CARDIOVASCULAR NEGATIVE: 1

## 2022-10-25 ENCOUNTER — HOSPITAL ENCOUNTER (EMERGENCY)
Facility: CLINIC | Age: 32
Discharge: HOME | End: 2022-10-25
Attending: NURSE PRACTITIONER | Admitting: NURSE PRACTITIONER
Payer: COMMERCIAL

## 2022-10-25 VITALS
OXYGEN SATURATION: 100 % | DIASTOLIC BLOOD PRESSURE: 77 MMHG | TEMPERATURE: 98.1 F | RESPIRATION RATE: 20 BRPM | SYSTOLIC BLOOD PRESSURE: 134 MMHG | HEART RATE: 83 BPM

## 2022-10-25 DIAGNOSIS — N89.8 VAGINAL DISCHARGE: ICD-10-CM

## 2022-10-25 DIAGNOSIS — R10.2 PELVIC CRAMPING: ICD-10-CM

## 2022-10-25 LAB
ALBUMIN UR-MCNC: NEGATIVE MG/DL
APPEARANCE UR: CLEAR
BILIRUB UR QL STRIP: NEGATIVE
CLUE CELLS: ABNORMAL
COLOR UR AUTO: YELLOW
GLUCOSE UR STRIP-MCNC: NEGATIVE MG/DL
HCG UR QL: NEGATIVE
HGB UR QL STRIP: NEGATIVE
KETONES UR STRIP-MCNC: NEGATIVE MG/DL
LEUKOCYTE ESTERASE UR QL STRIP: NEGATIVE
MUCOUS THREADS #/AREA URNS LPF: PRESENT /LPF
NITRATE UR QL: NEGATIVE
PH UR STRIP: 7 [PH] (ref 5–7)
RBC URINE: 0 /HPF
SP GR UR STRIP: 1.01 (ref 1–1.03)
SQUAMOUS EPITHELIAL: 1 /HPF
TRICHOMONAS, WET PREP: ABNORMAL
UROBILINOGEN UR STRIP-MCNC: NORMAL MG/DL
WBC URINE: <1 /HPF
WBC'S/HIGH POWER FIELD, WET PREP: ABNORMAL
YEAST, WET PREP: ABNORMAL

## 2022-10-25 PROCEDURE — 99202 OFFICE O/P NEW SF 15 MIN: CPT | Performed by: NURSE PRACTITIONER

## 2022-10-25 PROCEDURE — 81025 URINE PREGNANCY TEST: CPT | Performed by: NURSE PRACTITIONER

## 2022-10-25 PROCEDURE — 87210 SMEAR WET MOUNT SALINE/INK: CPT | Performed by: NURSE PRACTITIONER

## 2022-10-25 PROCEDURE — G0463 HOSPITAL OUTPT CLINIC VISIT: HCPCS | Performed by: NURSE PRACTITIONER

## 2022-10-25 PROCEDURE — 81001 URINALYSIS AUTO W/SCOPE: CPT | Performed by: NURSE PRACTITIONER

## 2022-10-25 RX ORDER — METRONIDAZOLE 7.5 MG/G
5 GEL VAGINAL DAILY
COMMUNITY
Start: 2022-10-21

## 2022-10-25 ASSESSMENT — ENCOUNTER SYMPTOMS: FLANK PAIN: 1

## 2022-10-25 ASSESSMENT — ACTIVITIES OF DAILY LIVING (ADL): ADLS_ACUITY_SCORE: 35

## 2022-10-25 NOTE — ED TRIAGE NOTES
Pt was diagnosed with bacterial vaginosis on 10/21/22 states that symptoms are not resolving, they are getting worse with increased discharge and white colored clumps.

## 2022-10-25 NOTE — ED PROVIDER NOTES
History     Chief Complaint   Patient presents with     Vaginal Problem     HPI  Corie Molina is a 31 year old female who presents to the urgent care fore valuation of vaginal discharge. She has been using Metrogel for recent BV diagnosis, finished last night. Presents today due to continued pelvic cramping, now has some low back pain, and think white discharge. Accompanying nipple tenderness, negative pregnancy test at clinic visit. Unsure menstrual cycle as she is currently breast feeding. No fevers, abdominal pain, nausea, vomiting, diarrhea, dysuria, frequency, urgency, hematuria or vaginal bleeding.     Allergies:  Allergies   Allergen Reactions     Erythromycin Nausea and Vomiting     Pcn [Penicillins] Rash       Problem List:    Patient Active Problem List    Diagnosis Date Noted     OCD (obsessive compulsive disorder) 11/29/2017     Priority: Medium     Overview:   [place] [facility] [place]  Team believes has OCD/anxiety/Depression.  Started Zoloft. Program very helpful.        Depression complicating pregnancy, antepartum 11/16/2017     Priority: Medium     Overview:   See therapist weekly, symptoms worsening at 36 weeks, declines medication, [place] [facility] [place]. Her therapist recommends more intense treatment.        Cervical high risk HPV (human papillomavirus) test positive 03/13/2016     Priority: Medium     Overview:   Pap: NIL, Colposcopy advised       Raynaud's disease 06/03/2014     Priority: Medium     Overview:   ICD 10          Past Medical History:    No past medical history on file.    Past Surgical History:    No past surgical history on file.    Family History:    Family History   Problem Relation Age of Onset     Diabetes Maternal Uncle      Cerebrovascular Disease Maternal Grandmother      Diabetes Maternal Grandfather      Cancer No family hx of      Hypertension No family hx of      Thyroid Disease No family hx of      Glaucoma No family  hx of      Macular Degeneration No family hx of        Social History:  Marital Status:   [2]  Social History     Tobacco Use     Smoking status: Never     Smokeless tobacco: Never     Tobacco comments:     Lives in smoke free househole        Medications:    metroNIDAZOLE (METROGEL) 0.75 % vaginal gel  acetaminophen (TYLENOL) 325 MG tablet  ibuprofen (ADVIL/MOTRIN) 200 MG tablet  Prenatal Vit-Fe Fumarate-FA (PRENATAL PLUS) 27-1 MG TABS  sertraline (ZOLOFT) 100 MG tablet          Review of Systems   Genitourinary: Positive for flank pain, pelvic pain and vaginal discharge.   All other systems reviewed and are negative.      Physical Exam   BP: 134/77  Pulse: 83  Temp: 98.1  F (36.7  C)  Resp: 20  SpO2: 100 %      Physical Exam  Constitutional:       General: She is not in acute distress.     Appearance: Normal appearance.   Eyes:      Conjunctiva/sclera: Conjunctivae normal.      Pupils: Pupils are equal, round, and reactive to light.   Cardiovascular:      Rate and Rhythm: Normal rate.   Pulmonary:      Effort: Pulmonary effort is normal.   Abdominal:      General: There is no distension.      Palpations: Abdomen is soft.      Tenderness: There is no abdominal tenderness. There is no right CVA tenderness, left CVA tenderness, guarding or rebound.   Musculoskeletal:         General: Normal range of motion.      Cervical back: Normal range of motion.   Skin:     General: Skin is warm.      Capillary Refill: Capillary refill takes less than 2 seconds.   Neurological:      General: No focal deficit present.      Mental Status: She is alert.         ED Course                 Procedures    Results for orders placed or performed during the hospital encounter of 10/25/22 (from the past 24 hour(s))   Wet prep    Specimen: Vagina; Swab   Result Value Ref Range    Trichomonas Absent Absent    Yeast Absent Absent    Clue Cells Absent Absent    WBCs/high power field 2+ (A) None   UA with Microscopic reflex to Culture     Specimen: Urine, Clean Catch   Result Value Ref Range    Color Urine Yellow Colorless, Straw, Light Yellow, Yellow    Appearance Urine Clear Clear    Glucose Urine Negative Negative mg/dL    Bilirubin Urine Negative Negative    Ketones Urine Negative Negative mg/dL    Specific Gravity Urine 1.010 1.003 - 1.035    Blood Urine Negative Negative    pH Urine 7.0 5.0 - 7.0    Protein Albumin Urine Negative Negative mg/dL    Urobilinogen Urine Normal Normal, 2.0 mg/dL    Nitrite Urine Negative Negative    Leukocyte Esterase Urine Negative Negative    Mucus Urine Present (A) None Seen /LPF    RBC Urine 0 <=2 /HPF    WBC Urine <1 <=5 /HPF    Squamous Epithelials Urine 1 <=1 /HPF    Narrative    Urine Culture not indicated   HCG qualitative urine   Result Value Ref Range    hCG Urine Qualitative Negative Negative       Medications - No data to display    Assessments & Plan (with Medical Decision Making)   Corie Molina is a 31 year old female who presents to the urgent care fore valuation of vaginal discharge. She has been using Metrogel for recent BV diagnosis, finished last night. Presents today due to continued pelvic cramping, now has some low back pain, and think white discharge. Vital signs normal, no worrisome findings on physical exam. Wet prep negative. Urinalysis without sign of infection. Hcg negative. At this time no urgent/emergent issues present, will discharge home and continue to monitor symptoms. LBP likely from recently starting to workout again. Return precautions reviewed, all questions answered. Patient is agreeable to plan of care and discharged in no acute distress.     I have reviewed the nursing notes.    I have reviewed the findings, diagnosis, plan and need for follow up with the patient.    Discharge Medication List as of 10/25/2022  7:31 PM          Final diagnoses:   Pelvic cramping   Vaginal discharge       10/25/2022   [facility] EMERGENCY DEPT     Rosio Ambriz, DILEEP  CNP  10/25/22 1947